# Patient Record
Sex: FEMALE | Race: WHITE | NOT HISPANIC OR LATINO | Employment: OTHER | URBAN - METROPOLITAN AREA
[De-identification: names, ages, dates, MRNs, and addresses within clinical notes are randomized per-mention and may not be internally consistent; named-entity substitution may affect disease eponyms.]

---

## 2019-03-26 ENCOUNTER — OFFICE VISIT (OUTPATIENT)
Dept: OBGYN CLINIC | Facility: OTHER | Age: 64
End: 2019-03-26
Payer: COMMERCIAL

## 2019-03-26 VITALS
BODY MASS INDEX: 27.93 KG/M2 | HEART RATE: 57 BPM | WEIGHT: 173.8 LBS | HEIGHT: 66 IN | DIASTOLIC BLOOD PRESSURE: 84 MMHG | SYSTOLIC BLOOD PRESSURE: 147 MMHG

## 2019-03-26 DIAGNOSIS — M46.1 SACROILIITIS (HCC): ICD-10-CM

## 2019-03-26 DIAGNOSIS — G89.29 CHRONIC RIGHT-SIDED LOW BACK PAIN, WITH SCIATICA PRESENCE UNSPECIFIED: Primary | ICD-10-CM

## 2019-03-26 DIAGNOSIS — S39.012A STRAIN OF LUMBAR REGION, INITIAL ENCOUNTER: ICD-10-CM

## 2019-03-26 DIAGNOSIS — M41.9 SCOLIOSIS OF THORACIC SPINE, UNSPECIFIED SCOLIOSIS TYPE: ICD-10-CM

## 2019-03-26 DIAGNOSIS — M54.5 CHRONIC RIGHT-SIDED LOW BACK PAIN, WITH SCIATICA PRESENCE UNSPECIFIED: Primary | ICD-10-CM

## 2019-03-26 PROCEDURE — 99203 OFFICE O/P NEW LOW 30 MIN: CPT | Performed by: ORTHOPAEDIC SURGERY

## 2019-03-26 RX ORDER — CALCIUM CARBONATE/VITAMIN D3 500-10/5ML
LIQUID (ML) ORAL
COMMUNITY
Start: 2013-07-29 | End: 2020-10-02 | Stop reason: SDUPTHER

## 2019-03-26 RX ORDER — TRAMADOL HYDROCHLORIDE 50 MG/1
50 TABLET ORAL EVERY 6 HOURS PRN
COMMUNITY
End: 2019-09-17 | Stop reason: SDUPTHER

## 2019-03-26 NOTE — PROGRESS NOTES
Assessment/Plan:    Diagnoses and all orders for this visit:    Chronic right-sided low back pain, with sciatica presence unspecified  -     Ambulatory referral to Physical Therapy; Future  -     Ambulatory referral to Pain Management; Future    Strain of lumbar region, initial encounter  -     Ambulatory referral to Physical Therapy; Future    Sacroiliitis ([facility] [address] )  -     Ambulatory referral to Physical Therapy; Future    Scoliosis of thoracic spine, unspecified scoliosis type    · Ibsi Mancia has chronic back pain and received therapeutic epidural injections by pain management in the past  · At this time, we recommended a formal course of physical therapy  · We also provided a referral to pain management for further consideration of epidural/sacroiliac injections  · We did request that You obtain medical records from prior pain management prior to following up with us  · Ibis Mancia can follow up with us on a PRN basis  · Ibis Mancia acknowledged understanding and agreement with the plan    Chief Complaint:  Back Pain    Subjective:     HPI    Ibis Mancia is a 58year old female that comes to the office for evaluation of back pain  She has a history of chronic back pain associated with lumbar radiculopathy and was following with pain management doctor more dissolve the for epidural lumbar injections in the right L2 and L3 transforaminal area, three total   Last injection was in August of 2018  Injections usually last 6 months  She also has a history of moderate scoliosis  She states that approximately 1 week ago she started to experience sudden onset of right-sided lumbar back pain after she was reaching for her dog on the ground  Giving her a sensation of "freezing up " Since then she has been experiencing sharp pain in the affected region, moderate to severe intensity, nonradiating  Pain is exacerbated with walking and sitting for long periods of time  Pain is a alleviated with lying down   She has a TENS unit and has been using it in the affected area  The pain has improved since original injury  Review of Systems   Constitutional: Negative for chills and fever  HENT: Negative for congestion, rhinorrhea and sore throat  Eyes: Negative for visual disturbance  Respiratory: Negative for shortness of breath  Cardiovascular: Negative for chest pain  Gastrointestinal: Negative for abdominal pain  Musculoskeletal: Positive for arthralgias (As per HPI)  Skin: Negative for rash  Objective:    Vitals:    03/26/19 0933   BP: 147/84   Pulse: 57       Physical Exam   Constitutional: She is oriented to person, place, and time  She appears well-developed and well-nourished  No distress  HENT:   Head: Normocephalic and atraumatic  Right Ear: External ear normal    Left Ear: External ear normal    Nose: Nose normal    Eyes: EOM are normal  No scleral icterus  Neck: Neck supple  Pulmonary/Chest: Effort normal  No respiratory distress  Abdominal: Soft  Neurological: She is alert and oriented to person, place, and time  Skin: Skin is warm  She is not diaphoretic  Psychiatric: She has a normal mood and affect  Back Exam     Tenderness   Back tenderness location: right lumbar region and SI joint  Range of Motion   Extension: normal   Flexion: normal   Lateral bend right: normal   Lateral bend left: normal   Rotation right: normal   Rotation left: normal     Muscle Strength   The patient has normal back strength  Tests   Straight leg raise right: negative  Straight leg raise left: negative    Reflexes   Patellar: normal  Achilles: normal    Other   Gait: normal     Comments:  Increased kyphosis and presence of scoliosis with left-sided thoracic curve  Positive ANNIKA on the right            I have personally reviewed pertinent films in PACS  Lumbar x-rays from 2015 reported mild retrolisthesis of L2 on L3, mild degenerative disc disease throughout lumbar spine and moderate scoliotic deformity

## 2019-03-27 ENCOUNTER — TELEPHONE (OUTPATIENT)
Dept: PAIN MEDICINE | Facility: CLINIC | Age: 64
End: 2019-03-27

## 2019-03-27 NOTE — TELEPHONE ENCOUNTER
Patient is trying to become a new patient with Dr Joy Cota, She was [facility] and they advised her unless the records request comes for the office it can take them a week to get he records sent  She is asking if the office can be contacted?  Phone #[phone]

## 2019-03-28 NOTE — TELEPHONE ENCOUNTER
It is the patient's responsibility to get their records to us unless she'd like to come to the office and fill out a Release of Medical Records form

## 2019-04-01 ENCOUNTER — EVALUATION (OUTPATIENT)
Dept: PHYSICAL THERAPY | Facility: REHABILITATION | Age: 64
End: 2019-04-01
Payer: COMMERCIAL

## 2019-04-01 DIAGNOSIS — G89.29 CHRONIC RIGHT-SIDED LOW BACK PAIN, WITH SCIATICA PRESENCE UNSPECIFIED: ICD-10-CM

## 2019-04-01 DIAGNOSIS — S39.012A STRAIN OF LUMBAR REGION, INITIAL ENCOUNTER: ICD-10-CM

## 2019-04-01 DIAGNOSIS — M46.1 SACROILIITIS (HCC): ICD-10-CM

## 2019-04-01 DIAGNOSIS — M54.5 CHRONIC RIGHT-SIDED LOW BACK PAIN, WITH SCIATICA PRESENCE UNSPECIFIED: ICD-10-CM

## 2019-04-01 PROCEDURE — 97161 PT EVAL LOW COMPLEX 20 MIN: CPT | Performed by: PHYSICAL THERAPIST

## 2019-04-04 ENCOUNTER — OFFICE VISIT (OUTPATIENT)
Dept: PHYSICAL THERAPY | Facility: REHABILITATION | Age: 64
End: 2019-04-04
Payer: COMMERCIAL

## 2019-04-04 DIAGNOSIS — S39.012A STRAIN OF LUMBAR REGION, INITIAL ENCOUNTER: ICD-10-CM

## 2019-04-04 DIAGNOSIS — M46.1 SACROILIITIS (HCC): ICD-10-CM

## 2019-04-04 DIAGNOSIS — G89.29 CHRONIC RIGHT-SIDED LOW BACK PAIN, WITH SCIATICA PRESENCE UNSPECIFIED: Primary | ICD-10-CM

## 2019-04-04 DIAGNOSIS — M54.5 CHRONIC RIGHT-SIDED LOW BACK PAIN, WITH SCIATICA PRESENCE UNSPECIFIED: Primary | ICD-10-CM

## 2019-04-04 PROCEDURE — 97140 MANUAL THERAPY 1/> REGIONS: CPT | Performed by: PHYSICAL THERAPIST

## 2019-04-04 PROCEDURE — 97112 NEUROMUSCULAR REEDUCATION: CPT | Performed by: PHYSICAL THERAPIST

## 2019-04-04 PROCEDURE — 97110 THERAPEUTIC EXERCISES: CPT | Performed by: PHYSICAL THERAPIST

## 2019-04-08 ENCOUNTER — TELEPHONE (OUTPATIENT)
Dept: PAIN MEDICINE | Facility: CLINIC | Age: 64
End: 2019-04-08

## 2019-04-09 ENCOUNTER — APPOINTMENT (OUTPATIENT)
Dept: PHYSICAL THERAPY | Facility: REHABILITATION | Age: 64
End: 2019-04-09
Payer: COMMERCIAL

## 2019-04-10 ENCOUNTER — APPOINTMENT (OUTPATIENT)
Dept: PHYSICAL THERAPY | Facility: REHABILITATION | Age: 64
End: 2019-04-10
Payer: COMMERCIAL

## 2019-04-23 ENCOUNTER — APPOINTMENT (OUTPATIENT)
Dept: PHYSICAL THERAPY | Facility: REHABILITATION | Age: 64
End: 2019-04-23
Payer: COMMERCIAL

## 2019-04-24 ENCOUNTER — CONSULT (OUTPATIENT)
Dept: PAIN MEDICINE | Facility: CLINIC | Age: 64
End: 2019-04-24
Payer: COMMERCIAL

## 2019-04-24 ENCOUNTER — HOSPITAL ENCOUNTER (OUTPATIENT)
Dept: RADIOLOGY | Facility: HOSPITAL | Age: 64
Discharge: HOME | End: 2019-04-24
Attending: ANESTHESIOLOGY
Payer: COMMERCIAL

## 2019-04-24 VITALS
HEART RATE: 64 BPM | SYSTOLIC BLOOD PRESSURE: 138 MMHG | TEMPERATURE: 99 F | BODY MASS INDEX: 27.92 KG/M2 | DIASTOLIC BLOOD PRESSURE: 72 MMHG | WEIGHT: 173 LBS

## 2019-04-24 DIAGNOSIS — M51.16 INTERVERTEBRAL DISC DISORDER WITH RADICULOPATHY OF LUMBAR REGION: Primary | ICD-10-CM

## 2019-04-24 DIAGNOSIS — M54.5 CHRONIC RIGHT-SIDED LOW BACK PAIN, WITH SCIATICA PRESENCE UNSPECIFIED: ICD-10-CM

## 2019-04-24 DIAGNOSIS — M25.551 RIGHT HIP PAIN: ICD-10-CM

## 2019-04-24 DIAGNOSIS — G89.29 CHRONIC RIGHT-SIDED LOW BACK PAIN, WITH SCIATICA PRESENCE UNSPECIFIED: ICD-10-CM

## 2019-04-24 PROCEDURE — 99244 OFF/OP CNSLTJ NEW/EST MOD 40: CPT | Performed by: ANESTHESIOLOGY

## 2019-04-24 PROCEDURE — 73502 X-RAY EXAM HIP UNI 2-3 VIEWS: CPT

## 2019-04-25 ENCOUNTER — APPOINTMENT (OUTPATIENT)
Dept: PHYSICAL THERAPY | Facility: REHABILITATION | Age: 64
End: 2019-04-25
Payer: COMMERCIAL

## 2019-04-29 ENCOUNTER — OFFICE VISIT (OUTPATIENT)
Dept: PHYSICAL THERAPY | Facility: REHABILITATION | Age: 64
End: 2019-04-29
Payer: COMMERCIAL

## 2019-04-29 ENCOUNTER — TELEPHONE (OUTPATIENT)
Dept: PAIN MEDICINE | Facility: CLINIC | Age: 64
End: 2019-04-29

## 2019-04-29 DIAGNOSIS — G89.29 CHRONIC RIGHT-SIDED LOW BACK PAIN, WITH SCIATICA PRESENCE UNSPECIFIED: Primary | ICD-10-CM

## 2019-04-29 DIAGNOSIS — M46.1 SACROILIITIS (HCC): ICD-10-CM

## 2019-04-29 DIAGNOSIS — S39.012A STRAIN OF LUMBAR REGION, INITIAL ENCOUNTER: ICD-10-CM

## 2019-04-29 DIAGNOSIS — M54.5 CHRONIC RIGHT-SIDED LOW BACK PAIN, WITH SCIATICA PRESENCE UNSPECIFIED: Primary | ICD-10-CM

## 2019-04-29 PROCEDURE — 97110 THERAPEUTIC EXERCISES: CPT | Performed by: PHYSICAL THERAPIST

## 2019-04-29 PROCEDURE — 97140 MANUAL THERAPY 1/> REGIONS: CPT | Performed by: PHYSICAL THERAPIST

## 2019-05-14 ENCOUNTER — HOSPITAL ENCOUNTER (OUTPATIENT)
Dept: RADIOLOGY | Facility: CLINIC | Age: 64
Discharge: HOME | End: 2019-05-14
Attending: ANESTHESIOLOGY
Payer: COMMERCIAL

## 2019-05-14 VITALS
SYSTOLIC BLOOD PRESSURE: 145 MMHG | TEMPERATURE: 98.8 F | HEART RATE: 83 BPM | OXYGEN SATURATION: 99 % | RESPIRATION RATE: 20 BRPM | DIASTOLIC BLOOD PRESSURE: 87 MMHG

## 2019-05-14 DIAGNOSIS — M51.16 INTERVERTEBRAL DISC DISORDER WITH RADICULOPATHY OF LUMBAR REGION: ICD-10-CM

## 2019-05-14 PROCEDURE — 64483 NJX AA&/STRD TFRM EPI L/S 1: CPT | Performed by: ANESTHESIOLOGY

## 2019-05-14 PROCEDURE — 64484 NJX AA&/STRD TFRM EPI L/S EA: CPT | Performed by: ANESTHESIOLOGY

## 2019-05-14 RX ORDER — METHYLPREDNISOLONE ACETATE 80 MG/ML
80 INJECTION, SUSPENSION INTRA-ARTICULAR; INTRALESIONAL; INTRAMUSCULAR; PARENTERAL; SOFT TISSUE ONCE
Status: COMPLETED | OUTPATIENT
Start: 2019-05-14 | End: 2019-05-14

## 2019-05-14 RX ORDER — BUPIVACAINE HCL/PF 2.5 MG/ML
10 VIAL (ML) INJECTION ONCE
Status: COMPLETED | OUTPATIENT
Start: 2019-05-14 | End: 2019-05-14

## 2019-05-14 RX ORDER — 0.9 % SODIUM CHLORIDE 0.9 %
10 VIAL (ML) INJECTION ONCE
Status: COMPLETED | OUTPATIENT
Start: 2019-05-14 | End: 2019-05-14

## 2019-05-14 RX ADMIN — BUPIVACAINE HYDROCHLORIDE 2 ML: 2.5 INJECTION, SOLUTION EPIDURAL; INFILTRATION; INTRACAUDAL at 10:10

## 2019-05-14 RX ADMIN — SODIUM CHLORIDE 4 ML: 9 INJECTION, SOLUTION INTRAMUSCULAR; INTRAVENOUS; SUBCUTANEOUS at 10:08

## 2019-05-14 RX ADMIN — Medication 4 ML: at 10:08

## 2019-05-14 RX ADMIN — IOHEXOL 1 ML: 300 INJECTION, SOLUTION INTRAVENOUS at 10:10

## 2019-05-14 RX ADMIN — METHYLPREDNISOLONE ACETATE 80 MG: 80 INJECTION, SUSPENSION INTRA-ARTICULAR; INTRALESIONAL; INTRAMUSCULAR; PARENTERAL; SOFT TISSUE at 10:10

## 2019-05-21 ENCOUNTER — TELEPHONE (OUTPATIENT)
Dept: PAIN MEDICINE | Facility: CLINIC | Age: 64
End: 2019-05-21

## 2019-07-16 ENCOUNTER — TELEPHONE (OUTPATIENT)
Dept: PAIN MEDICINE | Facility: CLINIC | Age: 64
End: 2019-07-16

## 2019-07-16 NOTE — TELEPHONE ENCOUNTER
Patient call requesting to speak to a nurse about when she is able to get her next injection & information about variations of injections   Please advisefred    Call back# [phone]

## 2019-07-16 NOTE — TELEPHONE ENCOUNTER
S/w pt, she said her R L2-3 TFESI on 5/14 did not work  Pt said she thinks it did not work because we did not use the same medications as Dr Colleen Kearns, he used 1 mL/40 mg Triamcinolone  Pt said when she got injections with him they lasted 6 months  Pt would like to discuss this in office and discuss us taking over Tramadol  Made pt an appt with HA on 7/24 to arrive at 8:45 am      **Dr Lee Grady, pt would like to know what you use that is similar to the Triamcinolone, if we do not use that med?

## 2019-07-16 NOTE — TELEPHONE ENCOUNTER
Pt contacted Call Center requested refill of their medication  Medication Name: Tramadol      Dosage of Med: 50 mg      Frequency of Med: Take 50 mg by mouth every 6 (six) hours as needed for moderate pain      Remaining Medication: 12 -15 tablets      Pharmacy and Location: [facility] on file        Pt  Preferred Callback Phone Number: [phone]       Thank you

## 2019-07-24 ENCOUNTER — OFFICE VISIT (OUTPATIENT)
Dept: PAIN MEDICINE | Facility: CLINIC | Age: 64
End: 2019-07-24
Payer: COMMERCIAL

## 2019-07-24 VITALS
RESPIRATION RATE: 18 BRPM | DIASTOLIC BLOOD PRESSURE: 86 MMHG | WEIGHT: 173 LBS | SYSTOLIC BLOOD PRESSURE: 124 MMHG | HEART RATE: 55 BPM | BODY MASS INDEX: 27.8 KG/M2 | HEIGHT: 66 IN

## 2019-07-24 DIAGNOSIS — Z79.891 LONG-TERM CURRENT USE OF OPIATE ANALGESIC: ICD-10-CM

## 2019-07-24 DIAGNOSIS — M51.16 INTERVERTEBRAL DISC DISORDER WITH RADICULOPATHY OF LUMBAR REGION: ICD-10-CM

## 2019-07-24 DIAGNOSIS — F11.20 UNCOMPLICATED OPIOID DEPENDENCE (HCC): ICD-10-CM

## 2019-07-24 DIAGNOSIS — M47.816 LUMBAR SPONDYLOSIS: ICD-10-CM

## 2019-07-24 DIAGNOSIS — G89.4 CHRONIC PAIN SYNDROME: Primary | ICD-10-CM

## 2019-07-24 PROCEDURE — 80305 DRUG TEST PRSMV DIR OPT OBS: CPT | Performed by: NURSE PRACTITIONER

## 2019-07-24 PROCEDURE — 99214 OFFICE O/P EST MOD 30 MIN: CPT | Performed by: NURSE PRACTITIONER

## 2019-07-24 NOTE — PROGRESS NOTES
Pt c/o lower back pain that radiates to the right hip and leg    Assessment:  1  Chronic pain syndrome    2  Intervertebral disc disorder with radiculopathy of lumbar region    3  Uncomplicated opioid dependence ([address] )    4  Long-term current use of opiate analgesic    5  Lumbar spondylosis        Plan:  Wilner Hannah is a 58 y o  female with a history of chronic right-sided low back pain with sciatica, lumbar intervertebral disc disorder radiculopathy, and right hip pain  The patient continues with ongoing low back pain, despite undergoing a  right L2-L3 transforaminal steroid injection  She reported that she had longer relief of symptoms after undergoing the same injection with another provider with the use of triamcinolone and would like to repeat the injection if this steroid was used  I discussed with the patient that she should have seen a relief in symptoms despite the steroid used for the injection  I discussed with her that she may have progression of disc protrusions or spondylosis within her lumbar spine that may be causing her not to have relief of symptoms after this injection  I recommended proceeding with an updated MRI of her lumbar spine for further evaluation  She was instructed that our office will call with results of this study once is completed  I did discuss with the patient that her symptoms may also be radiating from facet arthropathy noted in her lumbar spine  She was noted to have bilateral facet tenderness and bilateral facet loading on examination  I discussed with the patient about proceeding with bilateral L3-L5 medial branch blocks  However the patient reports that she would like to hold off on this procedure at this time  The patient was seeing Jessie Lara in the past and was prescribed tramadol 50 milligrams 3 times daily  She reports that she has greater than 20 tablets remaining from a prescription that was filled on 11/30/2018   She is requesting that our office take over prescribing this medication  I discussed with the patient that I will conduct a baseline urine drug screen this office visit  However, performing a baseline urine drug screen is not a guarantee that our office will take over prescribing her opioid medications  I discussed with her that our office would have to review the results of her urine drug screen and then would make a decision if she was a candidate for our office to take over prescribing her opioid medications  She verbalized understanding  I did discuss with the patient additional medications  However, the patient reports that she is not interested in trying any additional medications at this time, due to she tried multiple medications in the past and they were not helpful at managing her pain and symptoms  There are risks associated with opioid medications, including dependence, addiction and tolerance  The patient understands and agrees to use these medications only as prescribed  Potential side effects of the medications include, but are not limited to, constipation, drowsiness, addiction, impaired judgment and risk of fatal overdose if not taken as prescribed  The patient was warned against driving while taking sedation medications  Sharing medications is a felony  At this point in time, the patient is showing no signs of addiction, abuse, diversion or suicidal ideation  A urine drug screen was collected at today's office visit as part of our medication management protocol  The point of care testing results were appropriate for what was being prescribed  The specimen will be sent for confirmatory testing  The drug screen is medically necessary because the patient is either dependent on opioid medication or is being considered for opioid medication therapy and the results could impact ongoing or future treatment   The drug screen is to evaluate for the presences or absence of prescribed, non-prescribed, and/or illicit drugs/substances  [place] Prescription Drug Monitoring Program report was reviewed and was appropriate     The patient will follow up in 4 weeks or sooner with the worsening of symptoms  My impressions and treatment recommendations were discussed in detail with the patient who verbalized understanding and had no further questions  Discharge instructions were provided  I personally saw and examined the patient and I agree with the above discussed plan of care  Orders Placed This Encounter   Procedures    MRI lumbar spine wo contrast     Standing Status:   Future     Standing Expiration Date:   7/24/2023     Scheduling Instructions: There is no preparation for this test  Please leave your jewelry and valuables at home, wedding rings are the exception  Please bring your insurance cards, a form of photo ID and a list of your medications with you  Arrive 15 minutes prior to your appointment time in order to register  Please bring any prior CT or MRI studies of this area that were not performed at a [facility]  To schedule this appointment, please contact Central Scheduling at [phone]  Order Specific Question:   What is the patient's sedation requirement? Answer:   No Sedation     No orders of the defined types were placed in this encounter  History of Present Illness:  Dominic Mares is a 58 y o  female with a history of chronic right-sided low back pain with sciatica, lumbar intervertebral disc disorder radiculopathy, and right hip pain  The patient was last seen in the office on 5/14/2019 where she underwent a right L2-L3 transforaminal steroid injection  She reports that this injection helped for 20-30% for 1 week and then symptoms returned the following week  She presents for a follow up office visit in regards to Back Pain (radiates to the right hip and leg); Hip Pain; and Leg Pain     The patients current symptoms include right-sided low back pain that radiates down the anterior aspect of her right thigh to her knee  She does report the worsening of symptoms after gardening on 05/31/2019 where she bent over and felt a pop in her back that radiated across her back  She denies bilateral leg weakness or bowel or bladder issues  She describes her pain as burning, dull aching, sharp, shooting pain that is constant nature occurring throughout the day  The patient reports that her pain is symptoms are unchanged since last office visit  She currently rates her pain a 5 to 8/10 numeric pain scale  Current pain medications includes:  Tramadol 50 mg 1 tablet 3 times daily   The patient reports that this regimen is providing 30% pain relief  The patient is reporting no side effects from this pain medication regimen  I have personally reviewed and/or updated the patient's past medical history, past surgical history, family history, social history, current medications, allergies, and vital signs today  Review of Systems   Respiratory: Negative for shortness of breath  Cardiovascular: Negative for chest pain  Gastrointestinal: Negative for constipation, diarrhea, nausea and vomiting  Musculoskeletal: Positive for gait problem and joint swelling  Negative for arthralgias and myalgias  Decreased ROM  Joint stiffness   Skin: Negative for rash  Neurological: Negative for dizziness, seizures and weakness  All other systems reviewed and are negative        Patient Active Problem List   Diagnosis    Intervertebral disc disorder with radiculopathy of lumbar region       Past Medical History:   Diagnosis Date    Atypical ductal hyperplasia of right breast     Feb 2010 ([facility])     Decreased libido     Disorder of bone and cartilage     Resolved 5/29/2015     Dysplasia of cervix     s/p cone biopsy - 2002    Epilepsy [facility])     has not had any episodes in 20 years   [address] no specialists, no meds    Hypothyroidism, unspecified Resolved 5/29/2015     Menopause     Sept 2008     Osteoarthrosis of hand     Left    Osteoporosis     Resolved 5/29/2015     Seizure grand mal ([facility] [address] )     Ulcerative colitis ([facility] [address] )     last c-scope 8 yrs ago wnl, treated with suppositories in the past- not active x 8-10 yrs    Uterine fibroid     Vitamin D deficiency, unspecified     Resolved 5/29/2015        Past Surgical History:   Procedure Laterality Date    BREAST SURGERY Right 03/2010    [address] , left lumpectomies [facility] ~ [address]  2002       Family History   Problem Relation Age of Onset    Hypertension Father     Heart disease Father     Fibromyalgia Mother     Osteoporosis Mother        Social History     Occupational History    Not on file   Tobacco Use    Smoking status: Former Smoker    Smokeless tobacco: Never Used   Substance and Sexual Activity    Alcohol use: Yes     Comment: 1-2 glasses per night - As per Care Everywhere     Drug use: Not on file    Sexual activity: Not on file       Current Outpatient Medications on File Prior to Visit   Medication Sig    Butalbital-APAP-Caffeine (FIORICET PO) Take by mouth    Magnesium Oxide 400 MG CAPS Take one tablet by mouth daily    Multiple Vitamins-Minerals (MULTIVITAMIN ADULTS PO) Reported on 8/5/2017    traMADol (ULTRAM) 50 mg tablet Take 50 mg by mouth every 6 (six) hours as needed for moderate pain     No current facility-administered medications on file prior to visit  No Known Allergies    Physical Exam:    /86 (BP Location: Right arm, Patient Position: Sitting, Cuff Size: Standard)   Pulse 55   Resp 18   Ht 5' 6" (1 676 m)   Wt 78 5 kg (173 lb)   BMI 27 92 kg/m²     Constitutional:normal, well developed, well nourished, alert, in no distress and non-toxic and no overt pain behavior   and overweight  Eyes:anicteric  HEENT:grossly intact  Neck:supple, symmetric, trachea midline and no masses   Pulmonary:even and unlabored  Cardiovascular:No edema or pitting edema present  Skin:Normal without rashes or lesions and well hydrated  Psychiatric:Mood and affect appropriate  Neurologic:Cranial Nerves II-XII grossly intact  Musculoskeletal:antalgic     Lumbar Spine Exam    Appearance:  Normal lordosis  Palpation/Tenderness:  left lumbar paraspinal tenderness  right lumbar paraspinal tenderness  Sensory:  no sensory deficits noted  Range of Motion:  Flexion:  Minimally limited  with pain  Extension:  Minimally limited  with pain  Lateral Flexion - Left:  Minimally limited  with pain  Lateral Flexion - Right:  Minimally limited  with pain  Rotation - Left:  Minimally limited  with pain  Rotation - Right:  Minimally limited  with pain  Motor Strength:  Left hip flexion:  5/5  Right hip flexion:  5/5  Left knee extension:  5/5  Right knee extension:  5/5  Left foot dorsiflexion:  5/5  Left foot plantar flexion:  5/5  Right foot dorsiflexion:  5/5  Right foot plantar flexion:  5/5        Imaging  MRI of the lumbar spine (11/9/2016)    Technique: Sagittal T1 and STIR  Sagittal and axial T2  History: Right lumbar radiculopathy  There is mild retrolisthesis of L2 and L1  The alteration of the marrow signal intensity at the T12, L1, L2 and L3  endplates is secondary to degenerative change  There is no marrow replacing  process  There are benign hemangiomas in the T11 and T12 vertebral bodies  At T12-L1, the disc is bulging  There is also a small superimposed central disc  protrusion  Anterior marginal osteophytes are also noted at this level  The  central canal and the neural foramina are patent  At L1-2, there is a mild disc bulge associated with anterior marginal  osteophytes  At L2-3, the disc has lost height along the right lateral aspect where there  are marginal osteophytes  There are mild degenerative changes of the facet  joints  Right greater than left neural foraminal stenosis is noted at this  level      At L3-4, the disc is bulging  The facet joints are degenerated  The central  canal and the neural foramina are patent  At L4-5, there is a bulging disc associated with an anterior annular fissure  Minimal degenerative changes are noted in the facet joints  At L5-S1, there are minimal degenerative changes of the facet joints   Source  signal abnormalities are seen at the conus medullaris which is located at  T12-L1

## 2019-07-26 ENCOUNTER — TELEPHONE (OUTPATIENT)
Dept: PAIN MEDICINE | Facility: CLINIC | Age: 64
End: 2019-07-26

## 2019-07-26 NOTE — TELEPHONE ENCOUNTER
Patient is calling to let ARGUELLES know that she has her MRI scheduled at [facility] [place] on 8/12/19  Did ARGUELLES find out if she has to do PT first before the MRI?  Call back# [phone]

## 2019-07-26 NOTE — TELEPHONE ENCOUNTER
S/w pt  Advised that MRI was only scheduled today so auth team will contact insurance company  Advised too soon to tell if insurance will require PT first  Stanford Hernandez will reach out when there is an update  Pt verbalized understanding

## 2019-08-05 NOTE — TELEPHONE ENCOUNTER
Patient is requesting a call back  She would like to know if she should move forward with getting the MRI done  Please reach out to patient thank you

## 2019-08-05 NOTE — TELEPHONE ENCOUNTER
See message below, pt asking if her MRI was approved or will she have to do PT first? Pls advise so I can let pt know if she can proceed with her MRI

## 2019-08-08 NOTE — TELEPHONE ENCOUNTER
Pt calling to see is it is possible to use the same depomedrol as previously use on her         Pt can be reached at [phone]

## 2019-08-08 NOTE — TELEPHONE ENCOUNTER
Pt informed that per Susanna Jarvis in the MRI auth team that no Melissa Kelley is required for her MRI, she can call and schedule her MRI

## 2019-08-08 NOTE — TELEPHONE ENCOUNTER
S/w pt, she is asking if Dr Jose Desouza can use Kenalog instead of Depomedrol, explained to pt that they are the same class of medication  Pt said she only wants to proceed with an injection if she can get Kenalog  Pt said she will have to go elsewhere if not  Pt said the injection also cost her $8000 last time so she wants something that will work  Pt said she is still going to get an MRI though

## 2019-08-12 ENCOUNTER — HOSPITAL ENCOUNTER (OUTPATIENT)
Dept: MRI IMAGING | Facility: HOSPITAL | Age: 64
Discharge: HOME | End: 2019-08-12
Payer: COMMERCIAL

## 2019-08-12 DIAGNOSIS — M51.16 INTERVERTEBRAL DISC DISORDER WITH RADICULOPATHY OF LUMBAR REGION: ICD-10-CM

## 2019-08-12 PROCEDURE — 72148 MRI LUMBAR SPINE W/O DYE: CPT

## 2019-08-16 ENCOUNTER — TELEPHONE (OUTPATIENT)
Dept: PAIN MEDICINE | Facility: CLINIC | Age: 64
End: 2019-08-16

## 2019-08-16 NOTE — TELEPHONE ENCOUNTER
I called the patient and left a message on her voice mail to call the office back for the results of her lumbar MRI  I also tried her home number which was busy  When she calls back can you have either Dr MCRAE or Elma Hackett go over her results with her

## 2019-08-23 NOTE — TELEPHONE ENCOUNTER
Please address when you return  She will ask about kenalog again - no  We do not use kenalog for our injections  We use depomedrol which is in the same class of steroids

## 2019-08-27 ENCOUNTER — OFFICE VISIT (OUTPATIENT)
Dept: PAIN MEDICINE | Facility: CLINIC | Age: 64
End: 2019-08-27
Payer: COMMERCIAL

## 2019-08-27 VITALS
DIASTOLIC BLOOD PRESSURE: 84 MMHG | BODY MASS INDEX: 27.8 KG/M2 | SYSTOLIC BLOOD PRESSURE: 126 MMHG | WEIGHT: 173 LBS | HEIGHT: 66 IN | RESPIRATION RATE: 18 BRPM

## 2019-08-27 DIAGNOSIS — G89.4 CHRONIC PAIN SYNDROME: Primary | ICD-10-CM

## 2019-08-27 DIAGNOSIS — M51.16 INTERVERTEBRAL DISC DISORDER WITH RADICULOPATHY OF LUMBAR REGION: ICD-10-CM

## 2019-08-27 DIAGNOSIS — M48.061 SPINAL STENOSIS OF LUMBAR REGION, UNSPECIFIED WHETHER NEUROGENIC CLAUDICATION PRESENT: ICD-10-CM

## 2019-08-27 DIAGNOSIS — O34.10 UTERINE FIBROIDS IN PREGNANCY, POSTPARTUM CONDITION: ICD-10-CM

## 2019-08-27 DIAGNOSIS — M47.816 LUMBAR SPONDYLOSIS: ICD-10-CM

## 2019-08-27 DIAGNOSIS — D25.9 UTERINE FIBROIDS IN PREGNANCY, POSTPARTUM CONDITION: ICD-10-CM

## 2019-08-27 PROCEDURE — 99213 OFFICE O/P EST LOW 20 MIN: CPT | Performed by: NURSE PRACTITIONER

## 2019-08-27 NOTE — PROGRESS NOTES
Pt c/o lower back pain that radiates to the right hip and leg    Assessment:  1  Chronic pain syndrome    2  Uterine fibroids in pregnancy, postpartum condition    3  Intervertebral disc disorder with radiculopathy of lumbar region    4  Lumbar spondylosis    5  Spinal stenosis of lumbar region, unspecified whether neurogenic claudication present        Plan:  Jahaira Stark is a 58 y o  female with a history of chronic pain syndrome secondary to lumbar intervertebral disc disorder radiculopathy, lumbar spondylosis  The patient continues with ongoing low back pain, which has worsened since last office visit  The patient did have an updated MRI of her lumbar spine since last office visit  , which was reviewed with her this office visit  Her MRI showed scoliosis and a mild spondylolisthesis at L1-L2 as well as a lateral subluxation at L1-L2 and L2-L3 resulting in mild spinal stenosis  She was also noted to have a bulge L3-4  I discussed with the patient about proceeding with a lumbar epidural steroid injection  However the patient reports that she would not like to proceed with any epidural steroid injections with our office due to we do not use kenalog for the injections  She reports that she will seek our another pain management doctor or return to [facility] for injections  She did want our office to take over her pain medications  However, since she will be transferring to another pain management office she will ask her family doctor for a small prescription prior to signing a new opioid contract with a new pain management specialist     The patient was also noted to have multiple uterine fibroids that were incidentally noted on her lumbar MRI with the largest fibroid measuring 4 6 cm  Radiology recommended that she follow up with an ultrasound for further evaluation  Therefore at this time I have ordered the patient an transvaginal ultrasound of her pelvis for further evaluation    She was also instructed to follow-up with her OBGYN doctor as well  The patient will follow up with our office on a as needed basis or sooner with the worsening of symptoms  My impressions and treatment recommendations were discussed in detail with the patient who verbalized understanding and had no further questions  Discharge instructions were provided  I personally saw and examined the patient and I agree with the above discussed plan of care  Orders Placed This Encounter   Procedures    US abdomen and pelvis with transvaginal     Standing Status:   Future     Standing Expiration Date:   8/27/2023     Scheduling Instructions:      No Food, coffee, carbonated beverages or dairy products after midnight for AM appointments  Water (unlimited amounts) and medications are allowed  PM patients must not have any food coffee, carbonated beverages or dairy products for a period of 4-8 hours prior to their appointment  Diabetics who need PM appointments may have a light breakfast but cannot have any additional food, carbonated beverages or dairy products after breakfast  Water (unlimited amounts) and medications are okay to have  Schedule the PM appointments for 4-8 hours after that morning meal             IF the patient is having a Pelvic Ultrasound or Renal Ultrasound the bladder filling prep must be given to the patient  These studies require drinking 24 ounces of water one hour prior to the appointment and they are NOT to empty their bladder before having their ultrasound examination  Please bring your insurance cards, a form of photo ID and a list of your medications with you  Arrive 15 minutes prior to your appointment time in order to register  To schedule this appointment, please contact Central Scheduling at [phone]       Order Specific Question:   Is this study for the evaluation of upper abdomen and pelvis with transvaginal?     Answer:   Yes     No orders of the defined types were placed in this encounter  History of Present Illness:  Víctor Alston is a 58 y o  female with a history of chronic pain syndrome secondary to lumbar intervertebral disc disorder radiculopathy, lumbar spondylosis  The patient was last seen in the office on 7/24/2019, where she was ordered an updated MRI of her lumbar spine for further evaluation  She presents for a follow up office visit in regards to Back Pain (radiates to the right hip and leg); Hip Pain; and Leg Pain  The patients current symptoms include right-sided low back pain that radiates down the lateral aspect of her right thigh stopping mid thigh  She denies bilateral leg weakness, or bowel or bladder issues  She describes her pain as dull aching, sharp, throbbing, shooting pain that is constant nature occurring throughout the day  The patient reports that her pain and symptoms have worsened since last office visit  She currently rates her pain as 7 out 10 numeric pain scale  I have personally reviewed and/or updated the patient's past medical history, past surgical history, family history, social history, current medications, allergies, and vital signs today       Review of Systems    Patient Active Problem List   Diagnosis    Intervertebral disc disorder with radiculopathy of lumbar region       Past Medical History:   Diagnosis Date    Atypical ductal hyperplasia of right breast     Feb 2010 ([facility]     Decreased libido     Disorder of bone and cartilage     Resolved 5/29/2015     Dysplasia of cervix     s/p cone biopsy - 2002    Epilepsy ([facility] [address] )     has not had any episodes in 20 years    Fibromyalgia     1988 no specialists, no meds    Hypothyroidism, unspecified     Resolved 5/29/2015     Menopause     Sept 2008     Osteoarthrosis of hand     Left    Osteoporosis     Resolved 5/29/2015     Seizure grand mal ([facility] [address] )     Ulcerative colitis ([facility] [address] )     last c-scope 8 yrs ago wnl, treated with suppositories in the past- not active x 8-10 yrs    Uterine fibroid     Vitamin D deficiency, unspecified     Resolved 5/29/2015        Past Surgical History:   Procedure Laterality Date    BREAST SURGERY Right 03/2010    [address] , left lumpectomies [facility] ~ [facility] BIOPSY  2002       Family History   Problem Relation Age of Onset    Hypertension Father     Heart disease Father     Fibromyalgia Mother     Osteoporosis Mother        Social History     Occupational History    Not on file   Tobacco Use    Smoking status: Former Smoker    Smokeless tobacco: Never Used   Substance and Sexual Activity    Alcohol use: Yes     Comment: 1-2 glasses per night - As per Care Everywhere     Drug use: Not on file    Sexual activity: Not on file       Current Outpatient Medications on File Prior to Visit   Medication Sig    Butalbital-APAP-Caffeine (FIORICET PO) Take by mouth    Magnesium Oxide 400 MG CAPS Take one tablet by mouth daily    Multiple Vitamins-Minerals (MULTIVITAMIN ADULTS PO) Reported on 8/5/2017    traMADol (ULTRAM) 50 mg tablet Take 50 mg by mouth every 6 (six) hours as needed for moderate pain     No current facility-administered medications on file prior to visit  No Known Allergies    Physical Exam:    /84 (BP Location: Right arm, Patient Position: Sitting, Cuff Size: Standard)   Resp 18   Ht 5' 6" (1 676 m)   Wt 78 5 kg (173 lb)   BMI 27 92 kg/m²     Constitutional:normal, well developed, well nourished, alert, in no distress and non-toxic and no overt pain behavior   and overweight  Eyes:anicteric  HEENT:grossly intact  Neck:supple, symmetric, trachea midline and no masses   Pulmonary:even and unlabored  Cardiovascular:No edema or pitting edema present  Skin:Normal without rashes or lesions and well hydrated  Psychiatric:Mood and affect appropriate  Neurologic:Cranial Nerves II-XII grossly intact  Musculoskeletal:antalgic     Lumbar Spine Exam    Appearance:  Normal lordosis  Palpation/Tenderness:  left lumbar paraspinal tenderness  right lumbar paraspinal tenderness  Sensory:  no sensory deficits noted  Range of Motion:  Flexion:  Minimally limited  with pain  Extension:  Minimally limited  with pain  Lateral Flexion - Left:  Minimally limited  with pain  Lateral Flexion - Right:  Minimally limited  with pain  Rotation - Left:  Minimally limited  with pain  Rotation - Right:  Minimally limited  with pain  Motor Strength:  Left hip flexion:  5/5  Right hip flexion:  5/5  Left knee extension:  5/5  Right knee extension:  5/5  Left foot dorsiflexion:  5/5  Left foot plantar flexion:  5/5  Right foot dorsiflexion:  5/5  Right foot plantar flexion:  5/5    Imaging    MRI LUMBAR SPINE WITHOUT CONTRAST     INDICATION: Intermittent low back pain radiating to the right knee      COMPARISON:  Radiographs of lumbar spine from July 21, 2015      TECHNIQUE:  Sagittal T1, sagittal T2, sagittal inversion recovery, axial T1 and axial T2, coronal T2     IMAGE QUALITY:  Diagnostic     FINDINGS:     VERTEBRAL BODIES:  Levoscoliotic curvature to the lumbar spine is noted  The apex at the L2-L3 level for there is mild leftward lateral subluxation  There is also mild rightward lateral subluxation of L1 on L2  The vertebral bodies are maintained in   stature  There is a preserved lordotic curvature to the lumbar spine  Grade 1 retrolisthesis of L2 on L3 is noted  There is no evidence of spondylolysis  Heterogeneous marrow signal is noted within the L2 vertebral body with suggestion of multiple   vertebral body hemangiomas  There is also a hemangioma within the L3 vertebral body      SACRUM:  Normal signal within the sacrum  No evidence of insufficiency or stress fracture      DISTAL CORD AND CONUS:  Normal size and signal within the distal cord and conus        PARASPINAL SOFT TISSUES:  No prevertebral or paravertebral soft tissue edema    Suggestion of dorsal paraspinal muscular atrophy in the lower lumbar and upper sacral spine, slightly more pronounced on the left than the right  Leiomyomatous uterus with   multiple partially visualized uterine fibroids  The largest measures approximately 4 6 cm within the uterine fundus      LOWER THORACIC DISC SPACES:  Mild degenerative discogenic disease with ventral indentation on the thecal sac with no greater than mild spinal stenosis at T11-T12 and T12-L1  The neural foramina remain patent      LUMBAR DISC SPACES:  Multilevel disc desiccation with disc height loss most notable at L1-L2 and L2-L3      L1-L2:  Mild rightward lateral subluxation and uncovering of the left lateral margin of the disc annulus  Mild circumferential disc bulge and a tiny central protrusion  There is mild ventral indentation on the thecal sac  The spinal canal and neural   foramina remain patent      L2-L3:  Mild retrolisthesis and endplate osteophytic ridging with a circumferential disc bulge and mild facet arthropathy  There is mild ventral indentation on the thecal sac and mild spinal stenosis  The neural foramina remain patent      L3-L4:  Mild circumferential disc bulge with mild spinal stenosis  Patent neural foramina      L4-L5:  Normal      L5-S1:  Minimal facet arthropathy      IMPRESSION:     Scoliosis and mild spondylolisthesis as well as lateral subluxation with degenerative discogenic disease at L1-L2 and L2-L3 resulting in no greater than mild spinal stenosis      Disc bulge at L3-L4 results in mild spinal stenosis      No significant foraminal stenosis     Unexpected incidental finding of multiple uterine fibroids, the largest is identified within the fundus and noted to measure 4 6 cm    Consider follow-up sonography for additional characterization

## 2019-09-14 PROBLEM — M81.0 OSTEOPOROSIS OF LUMBAR SPINE: Status: ACTIVE | Noted: 2019-09-14

## 2019-09-14 PROBLEM — M51.369 DDD (DEGENERATIVE DISC DISEASE), LUMBAR: Status: ACTIVE | Noted: 2019-09-14

## 2019-09-14 PROBLEM — M51.36 DDD (DEGENERATIVE DISC DISEASE), LUMBAR: Status: ACTIVE | Noted: 2019-09-14

## 2019-09-17 ENCOUNTER — OFFICE VISIT (OUTPATIENT)
Dept: FAMILY MEDICINE CLINIC | Facility: CLINIC | Age: 64
End: 2019-09-17
Payer: MEDICARE

## 2019-09-17 VITALS
TEMPERATURE: 98.9 F | BODY MASS INDEX: 28.29 KG/M2 | RESPIRATION RATE: 16 BRPM | DIASTOLIC BLOOD PRESSURE: 78 MMHG | SYSTOLIC BLOOD PRESSURE: 124 MMHG | WEIGHT: 169.8 LBS | HEIGHT: 65 IN | OXYGEN SATURATION: 98 % | HEART RATE: 62 BPM

## 2019-09-17 DIAGNOSIS — G40.309 GENERALIZED CONVULSIVE EPILEPSY (HCC): ICD-10-CM

## 2019-09-17 DIAGNOSIS — Z13.220 LIPID SCREENING: ICD-10-CM

## 2019-09-17 DIAGNOSIS — Z00.00 WELL ADULT EXAM: Primary | ICD-10-CM

## 2019-09-17 DIAGNOSIS — M41.9 KYPHOSCOLIOSIS: ICD-10-CM

## 2019-09-17 DIAGNOSIS — M81.0 OSTEOPOROSIS OF LUMBAR SPINE: ICD-10-CM

## 2019-09-17 DIAGNOSIS — M51.16 INTERVERTEBRAL DISC DISORDER WITH RADICULOPATHY OF LUMBAR REGION: ICD-10-CM

## 2019-09-17 DIAGNOSIS — M79.7 FIBROMYALGIA: ICD-10-CM

## 2019-09-17 DIAGNOSIS — R73.01 IFG (IMPAIRED FASTING GLUCOSE): ICD-10-CM

## 2019-09-17 DIAGNOSIS — E55.9 VITAMIN D DEFICIENCY: ICD-10-CM

## 2019-09-17 DIAGNOSIS — G43.009 MIGRAINE WITHOUT AURA AND WITHOUT STATUS MIGRAINOSUS, NOT INTRACTABLE: ICD-10-CM

## 2019-09-17 DIAGNOSIS — Z11.59 NEED FOR HEPATITIS C SCREENING TEST: ICD-10-CM

## 2019-09-17 DIAGNOSIS — K51.819 OTHER ULCERATIVE COLITIS WITH COMPLICATION (HCC): ICD-10-CM

## 2019-09-17 DIAGNOSIS — M51.36 DDD (DEGENERATIVE DISC DISEASE), LUMBAR: ICD-10-CM

## 2019-09-17 PROBLEM — K51.90 ULCERATIVE COLITIS (HCC): Status: ACTIVE | Noted: 2019-09-17

## 2019-09-17 PROCEDURE — G0402 INITIAL PREVENTIVE EXAM: HCPCS | Performed by: FAMILY MEDICINE

## 2019-09-17 PROCEDURE — 99203 OFFICE O/P NEW LOW 30 MIN: CPT | Performed by: FAMILY MEDICINE

## 2019-09-17 RX ORDER — TRAMADOL HYDROCHLORIDE 50 MG/1
50 TABLET ORAL EVERY 6 HOURS PRN
Qty: 30 TABLET | Status: CANCELLED | OUTPATIENT
Start: 2019-09-17

## 2019-09-17 RX ORDER — BUTALBITAL, ACETAMINOPHEN AND CAFFEINE 300; 40; 50 MG/1; MG/1; MG/1
1 CAPSULE ORAL AS NEEDED
Qty: 30 CAPSULE | Refills: 1 | Status: SHIPPED | OUTPATIENT
Start: 2019-09-17 | End: 2020-03-25 | Stop reason: SDUPTHER

## 2019-09-17 RX ORDER — TRAMADOL HYDROCHLORIDE 50 MG/1
50 TABLET ORAL 2 TIMES DAILY PRN
Qty: 90 TABLET | Refills: 0 | Status: SHIPPED | OUTPATIENT
Start: 2019-09-17 | End: 2021-01-13 | Stop reason: SDUPTHER

## 2019-09-17 NOTE — PROGRESS NOTES
Assessment and Plan:     Problem List Items Addressed This Visit        Active Problems    Intervertebral disc disorder with radiculopathy of lumbar region    Osteoporosis of lumbar spine    DDD (degenerative disc disease), lumbar    Relevant Medications    traMADol (ULTRAM) 50 mg tablet    Fibromyalgia    Generalized convulsive epilepsy ([facility] [address] )    Kyphoscoliosis    Migraine without aura    Relevant Medications    traMADol (ULTRAM) 50 mg tablet    Butalbital-APAP-Caffeine (FIORICET) -40 MG CAPS    Vitamin D deficiency    Relevant Orders    Vitamin D 25 hydroxy    Ulcerative colitis ([facility] [address] )    Relevant Orders    Ambulatory referral to Colorectal Surgery      Other Visit Diagnoses     Well adult exam    -  Primary    IFG (impaired fasting glucose)        Relevant Orders    Comprehensive metabolic panel    Hemoglobin A1C    Lipid screening        Relevant Orders    Lipid panel    Need for hepatitis C screening test        Relevant Orders    Hepatitis C antibody           Reviewed appropriate diabetic & cardiovascular screening and prevention  Reviewed age appropriate cancer screenings and pros and cons  - she agrees to colonoscopy but declines mammo  She agrees to update pap  Last was normal    Reviewed bloodwork & immunizations that are appropriate for this patient  - she declines immunizations but agrees to blood work  Advanced care planning was reviewed  She has an advanced directive and will bring in paperwork for us  After reviews of risks, benefits of above ordered appropriate tests that pt agrees to  History of Present Illness:     Patient presents for Welcome to Medicare visit  Patient Care Team:  Rito Hill DO as PCP - General (Family Medicine)     Review of Systems:     Review of Systems   Constitutional: Negative for activity change, appetite change, chills, fever and unexpected weight change  HENT: Negative for congestion, ear pain and sore throat  Eyes: Negative for pain  Respiratory: Negative for shortness of breath  Cardiovascular: Negative for chest pain and leg swelling  Gastrointestinal: Negative for abdominal pain, constipation, diarrhea, nausea and vomiting  Genitourinary: Negative for dysuria  Musculoskeletal: Negative for joint swelling and myalgias  Skin: Negative for rash  Neurological: Negative for dizziness  Hematological: Negative for adenopathy  Psychiatric/Behavioral: Negative for dysphoric mood and sleep disturbance        Problem List:     Patient Active Problem List   Diagnosis    Intervertebral disc disorder with radiculopathy of lumbar region    Osteoporosis of lumbar spine    DDD (degenerative disc disease), lumbar    Fibromyalgia    Generalized convulsive epilepsy ([facility] [address] [facility]    Kyphoscoliosis    Migraine without aura    Varicose veins    Vitamin D deficiency    Ulcerative colitis ([facility] [address] )      Past Medical and Surgical History:     Past Medical History:   Diagnosis Date    Atypical ductal hyperplasia of right breast     Feb 2010 ([facility])     Decreased libido     Disorder of bone and cartilage     Resolved 5/29/2015     Dysplasia of cervix     s/p cone biopsy - 2002    Epilepsy ([facility] [address] )     has not had any episodes in 20 years   [address] no specialists, no meds    Hypothyroidism, unspecified     Resolved 5/29/2015     Menopause     Sept 2008     Osteoarthrosis of hand     Left    Osteoporosis     Resolved 5/29/2015     Seizure grand mal ([facility] [address] )     Ulcerative colitis ([facility] [address] )     last c-scope 8 yrs ago wnl, treated with suppositories in the past- not active x 8-10 yrs    Uterine fibroid     Vitamin D deficiency, unspecified     Resolved 5/29/2015      Past Surgical History:   Procedure Laterality Date    BREAST SURGERY Right 03/2010    JANKI LAND  [facility] , left lum[facility]omies [facility] ~ [address]  2002      Family History:     Family History   Problem Relation Age of Onset    Hypertension Father    Kelly Solis Heart disease Father     Heart attack Father     Fibromyalgia Mother     Osteoporosis Mother       Social History:     Social History     Socioeconomic History    Marital status: /Civil Union     Spouse name: None    Number of children: None    Years of education: None    Highest education level: None   Occupational History    None   Social Needs    Financial resource strain: None    Food insecurity:     Worry: None     Inability: None    Transportation needs:     Medical: None     Non-medical: None   Tobacco Use    Smoking status: Former Smoker     Packs/day: 0 25     Years: 4 00     Pack years: 1 00     Last attempt to quit: 1970     Years since quittin 0    Smokeless tobacco: Never Used   Substance and Sexual Activity    Alcohol use: Yes     Comment: 1-2 glasses per night - As per Care Everywhere     Drug use: None    Sexual activity: None   Lifestyle    Physical activity:     Days per week: None     Minutes per session: None    Stress: None   Relationships    Social connections:     Talks on phone: None     Gets together: None     Attends L[employer] day service: None     Active member of club or organization: None     Attends meetings of clubs or organizations: None     Relationship status: None    Intimate partner violence:     Fear of current or ex partner: None     Emotionally abused: None     Physically abused: None     Forced sexual activity: None   Other Topics Concern    None   Social History Narrative    None      Medications and Allergies:     Current Outpatient Medications   Medication Sig Dispense Refill    Magnesium Oxide 400 MG CAPS Take one tablet by mouth daily      Multiple Vitamins-Minerals (MULTIVITAMIN ADULTS PO) Reported on 2017      traMADol (ULTRAM) 50 mg tablet Take 1 tablet (50 mg total) by mouth 2 (two) times a day as needed for moderate pain 90 tablet 0    Butalbital-APAP-Caffeine (FIORICET) -40 MG CAPS Take 1 tablet by mouth as needed (migraine) 30 capsule 1     No current facility-administered medications for this visit  No Known Allergies   Immunizations: There is no immunization history on file for this patient  Health Maintenance:         Topic Date Due    Hepatitis C Screening  1955    MAMMOGRAM  1955    CRC Screening: Colonoscopy  1955         Topic Date Due    DTaP,Tdap,and Td Vaccines (1 - Tdap) 09/20/1975    INFLUENZA VACCINE  07/01/2019      Medicare Screening Tests and Risk Assessments:     Esvinadwoa Quinn is here for her Welcome to Medicare visit  Health Risk Assessment:   Patient rates overall health as very good  Patient feels that their physical health rating is slightly worse  Eyesight was rated as slightly worse  Hearing was rated as same  Patient feels that their emotional and mental health rating is same  Pain experienced in the last 7 days has been a lot  Patient's pain rating has been 9/10  Patient states that she has experienced no weight loss or gain in last 6 months  Fall Risk Screening: In the past year, patient has experienced: no history of falling in past year      Urinary Incontinence Screening:   Patient has not leaked urine accidently in the last six months  Home Safety:  Patient does not have trouble with stairs inside or outside of their home  Patient has working smoke alarms and has no working carbon monoxide detector  Home safety hazards include: none  Nutrition:   Current diet is Regular  Medications:   Patient is currently taking over-the-counter supplements  OTC medications include: see medication list  Patient is able to manage medications  Activities of Daily Living (ADLs)/Instrumental Activities of Daily Living (IADLs):   Walk and transfer into and out of bed and chair?: Yes  Dress and groom yourself?: Yes    Bathe or shower yourself?: Yes    Feed yourself?  Yes  Do your laundry/housekeeping?: Yes  Manage your money, pay your bills and track your expenses?: Yes  Make your own meals?: Yes    Do your own shopping?: Yes    Previous Hospitalizations:   Any hospitalizations or ED visits within the last 12 months?: No      Advance Care Planning:   Living will: No    Advanced directive: Yes    Advanced directive counseling given: Yes    End of Life Decisions reviewed with patient: Yes      Comments: Daughter is medical POA if needed - [place]  Would not want to remain on machines indefinitely  If there is hope, try reasonable options  Cognitive Screening:   Provider or family/friend/caregiver concerned regarding cognition?: No    PREVENTIVE SCREENINGS      Cardiovascular Screening:      Due for: Lipid Panel      Diabetes Screening:       Due for: Blood Glucose      Colorectal Cancer Screening:       Due for: Colonoscopy - High Risk      Breast Cancer Screening:     General: Patient Declines      Cervical Cancer Screening:      Due for: Cervical Pap Smear      Osteoporosis Screening:    General: Patient Declines and History Osteoporosis      Abdominal Aortic Aneurysm (AAA) Screening:        General: Screening Not Indicated      Lung Cancer Screening:     General: Screening Not Indicated      Hepatitis C Screening:      Hep C Screening Accepted: Yes       Visual Acuity Screening    Right eye Left eye Both eyes   Without correction:   20/30   With correction:           Physical Exam:     /78   Pulse 62   Temp 98 9 °F (37 2 °C) (Tympanic)   Resp 16   Ht 5' 5 35" (1 66 m)   Wt 77 kg (169 lb 12 8 oz)   SpO2 98%   BMI 27 95 kg/m²     Physical Exam  Constitutional: Appears well-developed and well-nourished  HENT: Head: Normocephalic  Sclera clear  Right Ear: External ear normal  Tympanic membrane normal    Left Ear: External ear normal  Tympanic membrane normal    Nose: Nose normal  No mucosal edema, No rhinorrhea  Mouth/Throat: Oropharynx is clear and moist   Eyes: Normal conjunctiva  No erythema  No discharge  Neck: Neck supple   No thyromegaly  Cardiovascular: Normal rate, regular rhythm and normal heart sounds  No murmur  Pulmonary/Chest: Effort normal and breath sounds normal  No wheezes, rales, or rhonchi  Abdominal: Soft  Bowel sounds are normal  There is no tenderness  No hepatosplenomegaly  Musculoskeletal: No lower extremity edema  + large kyphosclosis  Lymphadenopathy: No cervical adenopathy  Neurological: Alert and oriented to person, place, and time  Skin: Skin is warm and dry  No rash or lesions on scalp  Psychiatric: Normal mood and affect   Behavior is normal  Thought content normal      Ai Lavender, DO

## 2019-09-17 NOTE — PATIENT INSTRUCTIONS
Chair yoga   Check out Silver sneakers     If you want to go to PT call and ask for a referral:   Claire Hines [id number] location [address] [facility], [address] [facility] [address], [facility], [address]    [place] [address]  [address] [facility], [address] [place] [address], [address]             [employer] Preventive Visit Patient Instructions  Thank you for completing your Welcome to Medicare Visit or Medicare Annual Wellness Visit today  Your next wellness visit will be due in one year (9/17/2020)  The screening/preventive services that you may require over the next 5-10 years are detailed below  Some tests may not apply to you based off risk factors and/or age  Screening tests ordered at today's visit but not completed yet may show as past due  Also, please note that scanned in results may not display below  Preventive Screenings:  Service Recommendations Previous Testing/Comments   Colorectal Cancer Screening  * Colonoscopy    * Fecal Occult Blood Test (FOBT)/Fecal Immunochemical Test (FIT)  * Fecal DNA/Cologuard Test  * Flexible Sigmoidoscopy Age: 54-65 years old   Colonoscopy: every 10 years (may be performed more frequently if at higher risk)  OR  FOBT/FIT: every 1 year  OR  Cologuard: every 3 years  OR  Sigmoidoscopy: every 5 years  Screening may be recommended earlier than age 48 if at higher risk for colorectal cancer  Also, an individualized decision between you and your healthcare provider will decide whether screening between the ages of 74-80 would be appropriate   Colonoscopy: Not on file  FOBT/FIT: Not on file  Cologuard: Not on file  Sigmoidoscopy: Not on file         Breast Cancer Screening Age: 36 years old  Frequency: every 1-2 years  Not required if history of left and right mastectomy Mammogram: Not on file       Cervical Cancer Screening Between the ages of 21-29, pap smear recommended once every 3 years  Between the ages of 33-67, can perform pap smear with HPV co-testing every 5 years  Recommendations may differ for women with a history of total hysterectomy, cervical cancer, or abnormal pap smears in past  Pap Smear: Not on file       Hepatitis C Screening Once for adults born between 1945 and 1965  More frequently in patients at high risk for Hepatitis C Hep C Antibody: Not on file       Diabetes Screening 1-2 times per year if you're at risk for diabetes or have pre-diabetes Fasting glucose: No results in last 5 years   A1C: No results in last 5 years       Cholesterol Screening Once every 5 years if you don't have a lipid disorder  May order more often based on risk factors  Lipid panel: Not on file         Other Preventive Screenings Covered by Medicare:  1  Abdominal Aortic Aneurysm (AAA) Screening: covered once if your at risk  You're considered to be at risk if you have a family history of AAA  2  Lung Cancer Screening: covers low dose CT scan once per year if you meet all of the following conditions: (1) Age 50-69; (2) No signs or symptoms of lung cancer; (3) Current smoker or have quit smoking within the last 15 years; (4) You have a tobacco smoking history of at least 30 pack years (packs per day multiplied by number of years you smoked); (5) You get a written order from a healthcare provider  3  Glaucoma Screening: covered annually if you're considered high risk: (1) You have diabetes OR (2) Family history of glaucoma OR (3)  aged 48 and older OR (3)  American aged 72 and older  3   Osteoporosis Screening: covered every 2 years if you meet one of the following conditions: (1) You're estrogen deficient and at risk for osteoporosis based off medical history and other findings; (2) Have a vertebral abnormality; (3) On glucocorticoid therapy for more than 3 months; (4) Have primary hyperparathyroidism; (5) On osteoporosis medications and need to assess response to drug therapy  · Last bone density test (DXA Scan): Not on file  5  HIV Screening: covered annually if you're between the age of 12-76  Also covered annually if you are younger than 13 and older than 72 with risk factors for HIV infection  For pregnant patients, it is covered up to 3 times per pregnancy  Immunizations:  Immunization Recommendations   Influenza Vaccine Annual influenza vaccination during flu season is recommended for all persons aged >= 6 months who do not have contraindications   Pneumococcal Vaccine (Prevnar and Pneumovax)  * Prevnar = PCV13  * Pneumovax = PPSV23   Adults 25-60 years old: 1-3 doses may be recommended based on certain risk factors  Adults 72 years old: Prevnar (PCV13) vaccine recommended followed by Pneumovax (PPSV23) vaccine  If already received PPSV23 since turning 65, then PCV13 recommended at least one year after PPSV23 dose  Hepatitis B Vaccine 3 dose series if at intermediate or high risk (ex: diabetes, end stage renal disease, liver disease)   Tetanus (Td) Vaccine - COST NOT COVERED BY MEDICARE PART B Following completion of primary series, a booster dose should be given every 10 years to maintain immunity against tetanus  Td may also be given as tetanus wound prophylaxis  Tdap Vaccine - COST NOT COVERED BY MEDICARE PART B Recommended at least once for all adults  For pregnant patients, recommended with each pregnancy  Shingles Vaccine (Shingrix) - COST NOT COVERED BY MEDICARE PART B  2 shot series recommended in those aged 48 and above     Health Maintenance Due:      Topic Date Due    Hepatitis C Screening  1955    MAMMOGRAM  1955    CRC Screening: Colonoscopy  1955     Immunizations Due:      Topic Date Due    DTaP,Tdap,and Td Vaccines (1 - Tdap) 09/20/1975    INFLUENZA VACCINE  07/01/2019     Advance Directives   What are advance directives?   Advance directives are legal documents that state your wishes and plans for medical care  These plans are made ahead of time in case you lose your ability to make decisions for yourself  Advance directives can apply to any medical decision, such as the treatments you want, and if you want to donate organs  What are the types of advance directives? There are many types of advance directives, and each state has rules about how to use them  You may choose a combination of any of the following:  · Living will: This is a written record of the treatment you want  You can also choose which treatments you do not want, which to limit, and which to stop at a certain time  This includes surgery, medicine, IV fluid, and tube feedings  · Durable power of  for healthcare [facility]): This is a written record that states who you want to make healthcare choices for you when you are unable to make them for yourself  This person, called a proxy, is usually a family member or a friend  You may choose more than 1 proxy  · Do not resuscitate (DNR) order:  A DNR order is used in case your heart stops beating or you stop breathing  It is a request not to have certain forms of treatment, such as CPR  A DNR order may be included in other types of advance directives  · Medical directive: This covers the care that you want if you are in a coma, near death, or unable to make decisions for yourself  You can list the treatments you want for each condition  Treatment may include pain medicine, surgery, blood transfusions, dialysis, IV or tube feedings, and a ventilator (breathing machine)  · Values history: This document has questions about your views, beliefs, and how you feel and think about life  This information can help others choose the care that you would choose  Why are advance directives important? An advance directive helps you control your care  Although spoken wishes may be used, it is better to have your wishes written down  Spoken wishes can be misunderstood, or not followed   Treatments may be given even if you do not want them  An advance directive may make it easier for your family to make difficult choices about your care  Weight Management   Why it is important to manage your weight:  Being overweight increases your risk of health conditions such as heart disease, high blood pressure, type 2 diabetes, and certain types of cancer  It can also increase your risk for osteoarthritis, sleep apnea, and other respiratory problems  Aim for a slow, steady weight loss  Even a small amount of weight loss can lower your risk of health problems  How to lose weight safely:  A safe and healthy way to lose weight is to eat fewer calories and get regular exercise  You can lose up about 1 pound a week by decreasing the number of calories you eat by 500 calories each day  Healthy meal plan for weight management:  A healthy meal plan includes a variety of foods, contains fewer calories, and helps you stay healthy  A healthy meal plan includes the following:  · Eat whole-grain foods more often  A healthy meal plan should contain fiber  Fiber is the part of grains, fruits, and vegetables that is not broken down by your body  Whole-grain foods are healthy and provide extra fiber in your diet  Some examples of whole-grain foods are whole-wheat breads and pastas, oatmeal, brown rice, and bulgur  · Eat a variety of vegetables every day  Include dark, leafy greens such as spinach, kale, marc greens, and mustard greens  Eat yellow and orange vegetables such as carrots, sweet potatoes, and winter squash  · Eat a variety of fruits every day  Choose fresh or canned fruit (canned in its own juice or light syrup) instead of juice  Fruit juice has very little or no fiber  · Eat low-fat dairy foods  Drink fat-free (skim) milk or 1% milk  Eat fat-free yogurt and low-fat cottage cheese  Try low-fat cheeses such as mozzarella and other reduced-fat cheeses  · Choose meat and other protein foods that are low in fat  Choose beans or other legumes such as split peas or lentils  Choose fish, skinless poultry (chicken or turkey), or lean cuts of red meat (beef or pork)  Before you cook meat or poultry, cut off any visible fat  · Use less fat and oil  Try baking foods instead of frying them  Add less fat, such as margarine, sour cream, regular salad dressing and mayonnaise to foods  Eat fewer high-fat foods  Some examples of high-fat foods include french fries, doughnuts, ice cream, and cakes  · Eat fewer sweets  Limit foods and drinks that are high in sugar  This includes candy, cookies, regular soda, and sweetened drinks  Exercise:  Exercise at least 30 minutes per day on most days of the week  Some examples of exercise include walking, biking, dancing, and swimming  You can also fit in more physical activity by taking the stairs instead of the elevator or parking farther away from stores  Ask your healthcare provider about the best exercise plan for you  © Copyright [employer] 2018 Information is for End User's use only and may not be sold, redistributed or otherwise used for commercial purposes   All illustrations and images included in CareNotes® are the copyrighted property of A D A M , [employer]  or [address] [place] [address] [facility]

## 2019-09-17 NOTE — PROGRESS NOTES
Assessment/Plan:  1  DDD (degenerative disc disease), lumbar  Pain well controlled with ultram as needed  Will return to Dr Flex Jolley for injections  Had her sign controlled substance agreement today and will jackson get ultram from him again when she returns to him  Risks and benefits of ultram reviewed along with how to take it  Controlled substance agreement reviewed  Do not mix with Fiorcet  - traMADol (ULTRAM) 50 mg tablet; Take 1 tablet (50 mg total) by mouth 2 (two) times a day as needed for moderate pain  Dispense: 90 tablet; Refill: 0    2  Intervertebral disc disorder with radiculopathy of lumbar region  See above    3  Osteoporosis of lumbar spine  Pt declines any meds for this or any further testing  4  Migraine without aura and without status migrainosus, not intractable  Uses fiorcet rarely  Last prescription was > 2 years ago  PDMP reviewed and no red flags  Risks and benefits and side effects reviewed  Prescription:  - Butalbital-APAP-Caffeine (FIORICET) -40 MG CAPS; Take 1 tablet by mouth as needed (migraine)  Dispense: 30 capsule; Refill: 1    5  Fibromyalgia  Improved since menopause  6  Generalized convulsive epilepsy ([facility] [address] )  No concerns  7  Vitamin D deficiency  Update labs   - Vitamin D 25 hydroxy; Future    8  IFG (impaired fasting glucose)  Update labs   - Comprehensive metabolic panel; Future  - Hemoglobin A1C; Future    9  Lipid screening  Update labs   - Lipid panel; Future    10  Need for hepatitis C screening test  Agrees with labs   - Hepatitis C antibody; Future    11  Other ulcerative colitis with complication [facility])  Reminded importance of updated colonscopy  She agrees  She thinks her last was at [facility] [place] maybe 10 years ago  Denies any UC flares  - Ambulatory referral to Colorectal Surgery; Future    12  Kyphoscoliosis  Stable  Denies pain from this         Health Maintenance Due   Topic Date Due    Hepatitis C Screening  1955   Eugenie Rob Medicare Annual Wellness Visit (AWV)  1955    MAMMOGRAM  1955    CRC Screening: Colonoscopy  1954    BMI: Followup Plan  09/20/1972    DTaP,Tdap,and Td Vaccines (1 - Tdap) 09/20/1975    INFLUENZA VACCINE  07/01/2019      Reviewed appropriate diabetic & cardiovascular screening and prevention  Reviewed age appropriate cancer screenings and pros and cons  - she agrees to colonoscopy but declines mammo  She agrees to update pap  Last was normal    Reviewed bloodwork & immunizations that are appropriate for this patient  - she declines immunizations but agrees to blood work  Advanced care planning was reviewed  She has an advanced directive and will bring in paperwork for us  After reviews of risks, benefits of above ordered appropriate tests that pt agrees to  BMI Counseling: Body mass index is 27 95 kg/m²  Discussed the patient's BMI with her  The BMI is above normal  Nutrition recommendations include 3-5 servings of fruits/vegetables daily  Exercise recommendations include exercising 3-5 times per week  Return in about 3 months (around 12/17/2019) for pap  Patient Instructions     Chair yoga   Check out Silver sneakers     If you want to go to PT call and ask for a referral:   Claire Hines [id number] [facility] location [address] [place] [address], [address], [address]    [facility] [place] [address] [facility] [address]             [facility] Preventive Visit Patient Instructions  Thank you for completing your Welcome to Medicare Visit or Medicare Annual Wellness Visit today  Your next wellness visit will be due in one year (9/17/2020)  The screening/preventive services that you may require over the next 5-10 years are detailed below  Some tests may not apply to you based off risk factors and/or age   Screening tests ordered at today's visit but not completed yet may show as past due  Also, please note that scanned in results may not display below  Preventive Screenings:  Service Recommendations Previous Testing/Comments   Colorectal Cancer Screening  * Colonoscopy    * Fecal Occult Blood Test (FOBT)/Fecal Immunochemical Test (FIT)  * Fecal DNA/Cologuard Test  * Flexible Sigmoidoscopy Age: 54-65 years old   Colonoscopy: every 10 years (may be performed more frequently if at higher risk)  OR  FOBT/FIT: every 1 year  OR  Cologuard: every 3 years  OR  Sigmoidoscopy: every 5 years  Screening may be recommended earlier than age 48 if at higher risk for colorectal cancer  Also, an individualized decision between you and your healthcare provider will decide whether screening between the ages of 74-80 would be appropriate  Colonoscopy: Not on file  FOBT/FIT: Not on file  Cologuard: Not on file  Sigmoidoscopy: Not on file         Breast Cancer Screening Age: 36 years old  Frequency: every 1-2 years  Not required if history of left and right mastectomy Mammogram: Not on file       Cervical Cancer Screening Between the ages of 21-29, pap smear recommended once every 3 years  Between the ages of 33-67, can perform pap smear with HPV co-testing every 5 years  Recommendations may differ for women with a history of total hysterectomy, cervical cancer, or abnormal pap smears in past  Pap Smear: Not on file       Hepatitis C Screening Once for adults born between 1945 and 1965  More frequently in patients at high risk for Hepatitis C Hep C Antibody: Not on file       Diabetes Screening 1-2 times per year if you're at risk for diabetes or have pre-diabetes Fasting glucose: No results in last 5 years   A1C: No results in last 5 years       Cholesterol Screening Once every 5 years if you don't have a lipid disorder  May order more often based on risk factors  Lipid panel: Not on file         Other Preventive Screenings Covered by Medicare:  1   Abdominal Aortic Aneurysm (AAA) Screening: covered once if your at risk  You're considered to be at risk if you have a family history of AAA  2  Lung Cancer Screening: covers low dose CT scan once per year if you meet all of the following conditions: (1) Age 50-69; (2) No signs or symptoms of lung cancer; (3) Current smoker or have quit smoking within the last 15 years; (4) You have a tobacco smoking history of at least 30 pack years (packs per day multiplied by number of years you smoked); (5) You get a written order from a healthcare provider  3  Glaucoma Screening: covered annually if you're considered high risk: (1) You have diabetes OR (2) Family history of glaucoma OR (3)  aged 48 and older OR (3)  American aged 72 and older  3  Osteoporosis Screening: covered every 2 years if you meet one of the following conditions: (1) You're estrogen deficient and at risk for osteoporosis based off medical history and other findings; (2) Have a vertebral abnormality; (3) On glucocorticoid therapy for more than 3 months; (4) Have primary hyperparathyroidism; (5) On osteoporosis medications and need to assess response to drug therapy  · Last bone density test (DXA Scan): Not on file  5  HIV Screening: covered annually if you're between the age of 12-76  Also covered annually if you are younger than 13 and older than 72 with risk factors for HIV infection  For pregnant patients, it is covered up to 3 times per pregnancy  Immunizations:  Immunization Recommendations   Influenza Vaccine Annual influenza vaccination during flu season is recommended for all persons aged >= 6 months who do not have contraindications   Pneumococcal Vaccine (Prevnar and Pneumovax)  * Prevnar = PCV13  * Pneumovax = PPSV23   Adults 25-60 years old: 1-3 doses may be recommended based on certain risk factors  Adults 72 years old: Prevnar (PCV13) vaccine recommended followed by Pneumovax (PPSV23) vaccine   If already received PPSV23 since turning 65, then PCV13 recommended at least one year after PPSV23 dose  Hepatitis B Vaccine 3 dose series if at intermediate or high risk (ex: diabetes, end stage renal disease, liver disease)   Tetanus (Td) Vaccine - COST NOT COVERED BY MEDICARE PART B Following completion of primary series, a booster dose should be given every 10 years to maintain immunity against tetanus  Td may also be given as tetanus wound prophylaxis  Tdap Vaccine - COST NOT COVERED BY MEDICARE PART B Recommended at least once for all adults  For pregnant patients, recommended with each pregnancy  Shingles Vaccine (Shingrix) - COST NOT COVERED BY MEDICARE PART B  2 shot series recommended in those aged 48 and above     Health Maintenance Due:      Topic Date Due    Hepatitis C Screening  1955    MAMMOGRAM  1955    CRC Screening: Colonoscopy  1955     Immunizations Due:      Topic Date Due    DTaP,Tdap,and Td Vaccines (1 - Tdap) 09/20/1975    INFLUENZA VACCINE  07/01/2019     Advance Directives   What are advance directives? Advance directives are legal documents that state your wishes and plans for medical care  These plans are made ahead of time in case you lose your ability to make decisions for yourself  Advance directives can apply to any medical decision, such as the treatments you want, and if you want to donate organs  What are the types of advance directives? There are many types of advance directives, and each state has rules about how to use them  You may choose a combination of any of the following:  · Living will: This is a written record of the treatment you want  You can also choose which treatments you do not want, which to limit, and which to stop at a certain time  This includes surgery, medicine, IV fluid, and tube feedings  · Durable power of  for healthcare [place] [facility]): This is a written record that states who you want to make healthcare choices for you when you are unable to make them for yourself   This person, called a proxy, is usually a family member or a friend  You may choose more than 1 proxy  · Do not resuscitate (DNR) order:  A DNR order is used in case your heart stops beating or you stop breathing  It is a request not to have certain forms of treatment, such as CPR  A DNR order may be included in other types of advance directives  · Medical directive: This covers the care that you want if you are in a coma, near death, or unable to make decisions for yourself  You can list the treatments you want for each condition  Treatment may include pain medicine, surgery, blood transfusions, dialysis, IV or tube feedings, and a ventilator (breathing machine)  · Values history: This document has questions about your views, beliefs, and how you feel and think about life  This information can help others choose the care that you would choose  Why are advance directives important? An advance directive helps you control your care  Although spoken wishes may be used, it is better to have your wishes written down  Spoken wishes can be misunderstood, or not followed  Treatments may be given even if you do not want them  An advance directive may make it easier for your family to make difficult choices about your care  Weight Management   Why it is important to manage your weight:  Being overweight increases your risk of health conditions such as heart disease, high blood pressure, type 2 diabetes, and certain types of cancer  It can also increase your risk for osteoarthritis, sleep apnea, and other respiratory problems  Aim for a slow, steady weight loss  Even a small amount of weight loss can lower your risk of health problems  How to lose weight safely:  A safe and healthy way to lose weight is to eat fewer calories and get regular exercise  You can lose up about 1 pound a week by decreasing the number of calories you eat by 500 calories each day     Healthy meal plan for weight management:  A healthy meal plan includes a variety of foods, contains fewer calories, and helps you stay healthy  A healthy meal plan includes the following:  · Eat whole-grain foods more often  A healthy meal plan should contain fiber  Fiber is the part of grains, fruits, and vegetables that is not broken down by your body  Whole-grain foods are healthy and provide extra fiber in your diet  Some examples of whole-grain foods are whole-wheat breads and pastas, oatmeal, brown rice, and bulgur  · Eat a variety of vegetables every day  Include dark, leafy greens such as spinach, kale, marc greens, and mustard greens  Eat yellow and orange vegetables such as carrots, sweet potatoes, and winter squash  · Eat a variety of fruits every day  Choose fresh or canned fruit (canned in its own juice or light syrup) instead of juice  Fruit juice has very little or no fiber  · Eat low-fat dairy foods  Drink fat-free (skim) milk or 1% milk  Eat fat-free yogurt and low-fat cottage cheese  Try low-fat cheeses such as mozzarella and other reduced-fat cheeses  · Choose meat and other protein foods that are low in fat  Choose beans or other legumes such as split peas or lentils  Choose fish, skinless poultry (chicken or turkey), or lean cuts of red meat (beef or pork)  Before you cook meat or poultry, cut off any visible fat  · Use less fat and oil  Try baking foods instead of frying them  Add less fat, such as margarine, sour cream, regular salad dressing and mayonnaise to foods  Eat fewer high-fat foods  Some examples of high-fat foods include french fries, doughnuts, ice cream, and cakes  · Eat fewer sweets  Limit foods and drinks that are high in sugar  This includes candy, cookies, regular soda, and sweetened drinks  Exercise:  Exercise at least 30 minutes per day on most days of the week  Some examples of exercise include walking, biking, dancing, and swimming   You can also fit in more physical activity by taking the stairs instead of the elevator or parking farther away from stores  Ask your healthcare provider about the best exercise plan for you  © Copyright [employer] 2018 Information is for End User's use only and may not be sold, redistributed or otherwise used for commercial purposes  All illustrations and images included in CareNotes® are the copyrighted property of [employer]  or [employer]         Subjective:   John Cortez is a 58 y o  female here today for a follow-up on her current medical conditions:  Patient Active Problem List   Diagnosis    Intervertebral disc disorder with radiculopathy of lumbar region    Osteoporosis of lumbar spine    DDD (degenerative disc disease), lumbar    Fibromyalgia    Generalized convulsive epilepsy ([address] )    Kyphoscoliosis    Migraine without aura    Varicose veins    Vitamin D deficiency    Ulcerative colitis ([facility] [address] )          Current Outpatient Medications   Medication Sig Dispense Refill    Magnesium Oxide 400 MG CAPS Take one tablet by mouth daily      Multiple Vitamins-Minerals (MULTIVITAMIN ADULTS PO) Reported on 8/5/2017      traMADol (ULTRAM) 50 mg tablet Take 1 tablet (50 mg total) by mouth 2 (two) times a day as needed for moderate pain 90 tablet 0    Butalbital-APAP-Caffeine (FIORICET) -40 MG CAPS Take 1 tablet by mouth as needed (migraine) 30 capsule 1     No current facility-administered medications for this visit  HPI:  Chief Complaint   Patient presents with    Medicare Wellness Visit     welcome to medicare,      -- Above per clinical staff and reviewed  --    PHQ-9 Depression Screening    PHQ-9:    Frequency of the following problems over the past two weeks:            used to see patient in 2013   rheum Maya Garcia   cmp lipids vit D HbA1C   elevated bp  stress  UC was referred to GI   Today:  Mortazzavi cortisone shots were working 3 -4   Had one here and did not work   Ultram for pain  takes it as needed - driving   2-3 times a week   Takes 1 -2 tablets  Laying down helps pain  Tens machine does not help much  Ointment with albino tumeric CBD   Arthritis right wrist and tthumb - magnet  Arthritis in right hip and   Gardening   advil 1 -2 tabelts, 2 -3 times a week   FM no longer a bother, migraines less     Last migraine - can be smelled triggered, stress, sun  Not as bothered by light or noise not as bad  Terrible pain 7/10  No nausea  Fiorcet helps in 1 -2 hours  Last physical therapy made it worse - that was last in May  Some yoga does help  Curves when it was open  heatlhy diet   UC - had colonoscopy 10 years - women, twin sisters, at [address] [facility]  Declines mammo - painful     DEXA 2013 - using supplements           The following portions of the patient's history were reviewed and updated as appropriate: allergies, current medications, past family history, past medical history, past social history, past surgical history and problem list     Objective:  Vitals:  /78   Pulse 62   Temp 98 9 °F (37 2 °C) (Tympanic)   Resp 16   Ht 5' 5 35" (1 66 m)   Wt 77 kg (169 lb 12 8 oz)   SpO2 98%   BMI 27 95 kg/m²    Wt Readings from Last 3 Encounters:   09/17/19 77 kg (169 lb 12 8 oz)   08/27/19 78 5 kg (173 lb)   07/24/19 78 5 kg (173 lb)      BP Readings from Last 3 Encounters:   09/17/19 124/78   08/27/19 126/84   07/24/19 124/86        Review of Systems   She has no other concerns  No unexpected weight changes  No chest pain, SOB, or palpitations  No GERD  No changes in bowels or bladder  Sleeping well  No mood changes  Physical Exam   Constitutional: Appears well-developed and well-nourished  HENT: Head: Normocephalic  Sclera clear  Right Ear: External ear normal  Tympanic membrane normal    Left Ear: External ear normal  Tympanic membrane normal    Nose: Nose normal  No mucosal edema, No rhinorrhea  Mouth/Throat: Oropharynx is clear and moist   Eyes: Normal conjunctiva   No erythema  No discharge  Neck: Neck supple   No thyromegaly  Cardiovascular: Normal rate, regular rhythm and normal heart sounds  No murmur  Pulmonary/Chest: Effort normal and breath sounds normal  No wheezes, rales, or rhonchi  Abdominal: Soft  Bowel sounds are normal  There is no tenderness  No hepatosplenomegaly  Musculoskeletal: No lower extremity edema  + large kyphosclosis  Lymphadenopathy: No cervical adenopathy  Neurological: Alert and oriented to person, place, and time  Skin: Skin is warm and dry  No rash or lesions on scalp  Psychiatric: Normal mood and affect   Behavior is normal  Thought content normal

## 2019-09-24 DIAGNOSIS — G43.009 MIGRAINE WITHOUT AURA AND WITHOUT STATUS MIGRAINOSUS, NOT INTRACTABLE: ICD-10-CM

## 2019-09-24 RX ORDER — BUTALBITAL, ACETAMINOPHEN AND CAFFEINE 50; 325; 40 MG/1; MG/1; MG/1
TABLET ORAL
Qty: 30 TABLET | OUTPATIENT
Start: 2019-09-24

## 2019-09-30 DIAGNOSIS — E55.9 VITAMIN D DEFICIENCY: Primary | ICD-10-CM

## 2019-09-30 LAB
HBA1C MFR BLD HPLC: 5.7 %
HCV AB SER-ACNC: NEGATIVE

## 2019-10-03 RX ORDER — ERGOCALCIFEROL 1.25 MG/1
50000 CAPSULE ORAL WEEKLY
Qty: 12 CAPSULE | Refills: 0 | Status: SHIPPED | OUTPATIENT
Start: 2019-10-03 | End: 2019-12-17 | Stop reason: SDUPTHER

## 2019-12-17 ENCOUNTER — ANNUAL EXAM (OUTPATIENT)
Dept: FAMILY MEDICINE CLINIC | Facility: CLINIC | Age: 64
End: 2019-12-17
Payer: MEDICARE

## 2019-12-17 VITALS
BODY MASS INDEX: 26.68 KG/M2 | HEART RATE: 58 BPM | DIASTOLIC BLOOD PRESSURE: 84 MMHG | HEIGHT: 66 IN | TEMPERATURE: 98.5 F | SYSTOLIC BLOOD PRESSURE: 128 MMHG | WEIGHT: 166 LBS | OXYGEN SATURATION: 96 % | RESPIRATION RATE: 12 BRPM

## 2019-12-17 DIAGNOSIS — Z12.31 SCREENING MAMMOGRAM, ENCOUNTER FOR: ICD-10-CM

## 2019-12-17 DIAGNOSIS — Z01.419 ENCOUNTER FOR ROUTINE GYNECOLOGIC EXAMINATION IN MEDICARE PATIENT: Primary | ICD-10-CM

## 2019-12-17 DIAGNOSIS — E55.9 VITAMIN D DEFICIENCY: ICD-10-CM

## 2019-12-17 DIAGNOSIS — Z78.0 POST-MENOPAUSAL: ICD-10-CM

## 2019-12-17 DIAGNOSIS — Z13.220 LIPID SCREENING: ICD-10-CM

## 2019-12-17 DIAGNOSIS — R73.01 IFG (IMPAIRED FASTING GLUCOSE): ICD-10-CM

## 2019-12-17 PROCEDURE — 99214 OFFICE O/P EST MOD 30 MIN: CPT | Performed by: FAMILY MEDICINE

## 2019-12-17 PROCEDURE — 87624 HPV HI-RISK TYP POOLED RSLT: CPT | Performed by: FAMILY MEDICINE

## 2019-12-17 PROCEDURE — G0145 SCR C/V CYTO,THINLAYER,RESCR: HCPCS | Performed by: FAMILY MEDICINE

## 2019-12-17 RX ORDER — MELOXICAM 7.5 MG/1
7.5 TABLET ORAL ONCE AS NEEDED
Refills: 0 | COMMUNITY
Start: 2019-11-13 | End: 2020-10-02 | Stop reason: ALTCHOICE

## 2019-12-17 RX ORDER — ERGOCALCIFEROL 1.25 MG/1
50000 CAPSULE ORAL WEEKLY
Qty: 12 CAPSULE | Refills: 0 | Status: SHIPPED | OUTPATIENT
Start: 2019-12-17 | End: 2020-10-02 | Stop reason: ALTCHOICE

## 2019-12-17 NOTE — PATIENT INSTRUCTIONS
Your vitamin D level is low  This is not a vitamin that is well absorbed, it cannot be found in high doses in most foods, and sun exposure is not enough to correct the level  For this you should start an prescription for vitamin D 50,000 iu ONCE A WEEK for 12 weeks  This will not cure your low level, but it will help get it back to normal  After you complete this prescription you should repeat the blood work (you do not need to fast)  With that level we can tell you how much over-the-counter dosing you should take in order to maintain this level

## 2019-12-17 NOTE — PROGRESS NOTES
Assessment/Plan:      1  Encounter for routine gynecologic examination in Medicare patient  See below  Agrees this will be her last pap if normal    - Liquid-based pap, screening  - HPV High Risk; Future  - HPV High Risk    2  Vitamin D deficiency  Did not get last message to take prescription for vit D  Sent in now  Start over the counter once prescription is complete  - Vitamin D 25 hydroxy; Future  - ergocalciferol (VITAMIN D2) 50,000 units; Take 1 capsule (50,000 Units total) by mouth once a week for 12 doses  Dispense: 12 capsule; Refill: 0    3  IFG (impaired fasting glucose)  Work on diet changes to help, reviewed carbs especially  - Hemoglobin A1C; Future    4  Lipid screening  Update labs  - Comprehensive metabolic panel; Future  - Lipid panel; Future  - LDL cholesterol, direct; Future    5  Screening mammogram, encounter for  Has not had this but today does agree to get one   - Mammo screening bilateral w cad; Future    6  Post-menopausal  Also agrees to update dexa   - DXA bone density spine hip and pelvis; Future        ·         Reviewed importance of yearly mammogram for women over 40   Reviewed ACOG pap guidelines   -Women aged 2129 years should have a Pap test alone every 3 years  HPV testing is not recommended    -Women aged 3568 years should have a Pap test and an HPV test (co-testing) every 5 years (preferred)  It also is acceptable to have a Pap test alone every 3 years  This wll be her last pap  ·         Reviewed healthy diet, exercise, adequate calcium and vitamin D  Return in about 9 months (around 9/17/2020) for Medicare Wellness Visit  Subjective:    CATY Rosenbaum is a 71 y o  female who presents today for pap smear/GYN exam    Chief Complaint   Patient presents with    Gynecologic Exam     ---Above per clinical staff & reviewed  ---  see Sept fbw - vit D low   18  chol 222 112 HDL 77 LDL  123   A1C 5 7   due for UDS   dexa last 2013   HIV   2010no  High risk HPV   declines mammo    She thinks she had an abnormal in past and then a scrapping (results from 2005, 2007, 2010 2013 all normal - cone biopsy 2002)    Post-menopausal    Gynecologic History  No LMP recorded  Menopause around 49  No itching or burning   Not having sex   Contraception: none  Last Pap:2013  Results: normal  History of abnormal paps? no  Last Mammogram: Not on file - pt declined  She is now considering one  Agrees to get colonoscopy also now  Obstetric History  OB History   No data available        Vaginal discharge: No  Other concerns: none  Not SA  The following portions of the patient's history were reviewed and updated as appropriate: allergies, current medications, past family history, past medical history, past social history, past surgical history and problem list   Current Outpatient Medications   Medication Sig Dispense Refill    Butalbital-APAP-Caffeine (FIORICET) -40 MG CAPS Take 1 tablet by mouth as needed (migraine) 30 capsule 1    ergocalciferol (VITAMIN D2) 50,000 units Take 1 capsule (50,000 Units total) by mouth once a week for 12 doses 12 capsule 0    Magnesium Oxide 400 MG CAPS Take one tablet by mouth daily      meloxicam (MOBIC) 7 5 mg tablet Take 7 5 mg by mouth once as needed  0    Multiple Vitamins-Minerals (MULTIVITAMIN ADULTS PO) Reported on 8/5/2017      traMADol (ULTRAM) 50 mg tablet Take 1 tablet (50 mg total) by mouth 2 (two) times a day as needed for moderate pain 90 tablet 0     No current facility-administered medications for this visit  Review of Systems  ROS:  all others negative - no chest pain, SOB, normal urine and bowels  no GERD  sleeping well  mood good  Objective:    /84   Pulse 58   Temp 98 5 °F (36 9 °C)   Resp 12   Ht 5' 6" (1 676 m)   Wt 75 3 kg (166 lb)   SpO2 96%   BMI 26 79 kg/m²   Physical Exam   Physical Exam:  General:  well developed, well nourished, in no acute distress      Neck:  no masses, thyromegaly, or abnormal cervical nodes, no carotid bruit  Breasts:  no masses, adenopathy or nipple discharge  Lungs:  clear bilaterally to auscultation  Heart:  regular rate and rhythm, S1, S2 without murmurs, rubs, or gallops  Abdomen:   normal bowel sounds; soft non-tender, no hepatosplenomegaly, or masses noted  Psych:  alert and cooperative; normal mood and affect; normal attention span and concentration  Extremities:  no edema  Pelvic Exam  Vulva:       normal appearance, normal hair distribution, no lesions or masses  Urethra and Bladder:       Urethra--normal, no discharge  Bladder--normal, non-tender, non-distended  Vagina:       normal, ruggated, physiologic discharge, no lesions, no masses, no cystocele, adequate pelvic support  Cervix:       normal, midposition, no CMT, no lesions  Uterus:       smooth, anteverted, anteflexed, mobile, non-tender, firm, adequate support, no prolapse  Adnexa:       normal, no masses bilaterally, mobile bilaterally, nontender bilaterally

## 2019-12-18 LAB
HPV HR 12 DNA CVX QL NAA+PROBE: NEGATIVE
HPV16 DNA CVX QL NAA+PROBE: NEGATIVE
HPV18 DNA CVX QL NAA+PROBE: NEGATIVE

## 2019-12-20 LAB
LAB AP GYN PRIMARY INTERPRETATION: NORMAL
Lab: NORMAL

## 2020-01-09 NOTE — RESULT ENCOUNTER NOTE
Called and spoke with patient, patient notified of Dr. Greenwood's response to earlier message.  Patient states that he will purchase a blood pressure cuff himself.   See portal note

## 2020-02-05 ENCOUNTER — TELEPHONE (OUTPATIENT)
Dept: FAMILY MEDICINE CLINIC | Facility: CLINIC | Age: 65
End: 2020-02-05

## 2020-02-05 NOTE — TELEPHONE ENCOUNTER
Patient called and stated that she received a bill from Medicare regarding her lipid panel that was done in 8/2019 and denied by them  They are going to be sending her a form to be completed as to why the lipid panel was ordered  I discussed with the patient that when she receives the form she should bring it into the office for me and I will have Dr Tang Laughter look at it and complete the correct information as to why it was ordered

## 2020-02-28 NOTE — TELEPHONE ENCOUNTER
Patient dropped off form and all information is correct  The form needs to be received to Medicare by March 19, 2020, and the address is on the form  When the form is completed please return to me and I will scan into chart and mail back to Medicare   The form is placed in Dr Araceli Epps red folder

## 2020-03-06 ENCOUNTER — TELEPHONE (OUTPATIENT)
Dept: FAMILY MEDICINE CLINIC | Facility: CLINIC | Age: 65
End: 2020-03-06

## 2020-03-06 NOTE — TELEPHONE ENCOUNTER
Patient called and questioned about her form, I placed the form in Dr Sowmya Otero folder when dropped off, please call patient to update progress on phone, she is concerned that they will receive it on time

## 2020-03-09 NOTE — TELEPHONE ENCOUNTER
Per Marcia Frankel, form was faxed, message was left on appeal line and original form was mailed to the address on the form  Placed call to patient and informed her of the update  Patient was happy and had no additional questions

## 2020-03-25 DIAGNOSIS — G43.009 MIGRAINE WITHOUT AURA AND WITHOUT STATUS MIGRAINOSUS, NOT INTRACTABLE: ICD-10-CM

## 2020-03-25 RX ORDER — BUTALBITAL, ACETAMINOPHEN AND CAFFEINE 50; 325; 40 MG/1; MG/1; MG/1
TABLET ORAL
Qty: 30 TABLET | OUTPATIENT
Start: 2020-03-25

## 2020-03-25 RX ORDER — BUTALBITAL, ACETAMINOPHEN AND CAFFEINE 300; 40; 50 MG/1; MG/1; MG/1
1 CAPSULE ORAL AS NEEDED
Qty: 30 CAPSULE | Refills: 2 | Status: SHIPPED | OUTPATIENT
Start: 2020-03-25 | End: 2021-03-26 | Stop reason: SDUPTHER

## 2020-03-25 NOTE — TELEPHONE ENCOUNTER
[facility]40mg[facility]  She would like to pick the medication up on Friday  Please send her my chart message when order is sent

## 2020-03-25 NOTE — TELEPHONE ENCOUNTER
Medication refill received from [facility]  Please let the patient know that we do not accept refills directly from the pharmacy

## 2020-03-26 ENCOUNTER — TELEPHONE (OUTPATIENT)
Dept: FAMILY MEDICINE CLINIC | Facility: CLINIC | Age: 65
End: 2020-03-26

## 2020-03-26 NOTE — TELEPHONE ENCOUNTER
Placed call topharmacy  Confirmed okat for tablets vs capsules  Patient had a history of tablet use

## 2020-08-03 PROBLEM — Z12.11 SCREENING FOR MALIGNANT NEOPLASM OF COLON: Status: ACTIVE | Noted: 2020-08-03

## 2020-09-18 ENCOUNTER — ANESTHESIA EVENT (OUTPATIENT)
Dept: GASTROENTEROLOGY | Facility: AMBULARY SURGERY CENTER | Age: 65
End: 2020-09-18

## 2020-10-02 ENCOUNTER — HOSPITAL ENCOUNTER (OUTPATIENT)
Dept: GASTROENTEROLOGY | Facility: AMBULARY SURGERY CENTER | Age: 65
Setting detail: OUTPATIENT SURGERY
Discharge: HOME | End: 2020-10-02
Attending: COLON & RECTAL SURGERY
Payer: MEDICARE

## 2020-10-02 ENCOUNTER — ANESTHESIA (OUTPATIENT)
Dept: GASTROENTEROLOGY | Facility: AMBULARY SURGERY CENTER | Age: 65
End: 2020-10-02

## 2020-10-02 VITALS
OXYGEN SATURATION: 98 % | SYSTOLIC BLOOD PRESSURE: 129 MMHG | HEIGHT: 66 IN | DIASTOLIC BLOOD PRESSURE: 61 MMHG | HEART RATE: 45 BPM | RESPIRATION RATE: 16 BRPM | TEMPERATURE: 98 F | BODY MASS INDEX: 24.91 KG/M2 | WEIGHT: 155 LBS

## 2020-10-02 VITALS — HEART RATE: 52 BPM

## 2020-10-02 DIAGNOSIS — K51.819 OTHER ULCERATIVE COLITIS WITH COMPLICATION (HCC): ICD-10-CM

## 2020-10-02 DIAGNOSIS — Z12.11 SCREENING FOR MALIGNANT NEOPLASM OF COLON: ICD-10-CM

## 2020-10-02 PROBLEM — K63.89 PROCTOSIGMOIDITIS: Status: ACTIVE | Noted: 2020-10-02

## 2020-10-02 PROCEDURE — 45380 COLONOSCOPY AND BIOPSY: CPT | Performed by: COLON & RECTAL SURGERY

## 2020-10-02 PROCEDURE — 88305 TISSUE EXAM BY PATHOLOGIST: CPT | Performed by: PATHOLOGY

## 2020-10-02 RX ORDER — PROPOFOL 10 MG/ML
INJECTION, EMULSION INTRAVENOUS CONTINUOUS PRN
Status: DISCONTINUED | OUTPATIENT
Start: 2020-10-02 | End: 2020-10-02

## 2020-10-02 RX ORDER — CHLORAL HYDRATE 500 MG
1000 CAPSULE ORAL DAILY
COMMUNITY

## 2020-10-02 RX ORDER — SODIUM CHLORIDE, SODIUM LACTATE, POTASSIUM CHLORIDE, CALCIUM CHLORIDE 600; 310; 30; 20 MG/100ML; MG/100ML; MG/100ML; MG/100ML
100 INJECTION, SOLUTION INTRAVENOUS CONTINUOUS
Status: DISCONTINUED | OUTPATIENT
Start: 2020-10-02 | End: 2020-10-06 | Stop reason: HOSPADM

## 2020-10-02 RX ORDER — PROPOFOL 10 MG/ML
INJECTION, EMULSION INTRAVENOUS AS NEEDED
Status: DISCONTINUED | OUTPATIENT
Start: 2020-10-02 | End: 2020-10-02

## 2020-10-02 RX ORDER — LIDOCAINE HYDROCHLORIDE 10 MG/ML
INJECTION, SOLUTION EPIDURAL; INFILTRATION; INTRACAUDAL; PERINEURAL AS NEEDED
Status: DISCONTINUED | OUTPATIENT
Start: 2020-10-02 | End: 2020-10-02

## 2020-10-02 RX ADMIN — SODIUM CHLORIDE, SODIUM LACTATE, POTASSIUM CHLORIDE, AND CALCIUM CHLORIDE: .6; .31; .03; .02 INJECTION, SOLUTION INTRAVENOUS at 10:10

## 2020-10-02 RX ADMIN — PROPOFOL 100 MG: 10 INJECTION, EMULSION INTRAVENOUS at 10:23

## 2020-10-02 RX ADMIN — PROPOFOL 120 MCG/KG/MIN: 10 INJECTION, EMULSION INTRAVENOUS at 10:26

## 2020-10-02 RX ADMIN — LIDOCAINE HYDROCHLORIDE 50 MG: 10 INJECTION, SOLUTION EPIDURAL; INFILTRATION; INTRACAUDAL; PERINEURAL at 10:23

## 2020-10-02 RX ADMIN — PROPOFOL 50 MG: 10 INJECTION, EMULSION INTRAVENOUS at 10:25

## 2020-10-02 RX ADMIN — PROPOFOL 100 MG: 10 INJECTION, EMULSION INTRAVENOUS at 10:29

## 2020-10-06 ENCOUNTER — HOSPITAL ENCOUNTER (OUTPATIENT)
Dept: CT IMAGING | Facility: HOSPITAL | Age: 65
Discharge: HOME | End: 2020-10-06
Attending: COLON & RECTAL SURGERY

## 2020-10-07 ENCOUNTER — HOSPITAL ENCOUNTER (OUTPATIENT)
Dept: CT IMAGING | Facility: HOSPITAL | Age: 65
Discharge: HOME | End: 2020-10-07
Attending: COLON & RECTAL SURGERY
Payer: MEDICARE

## 2020-10-07 PROCEDURE — G1004 CDSM NDSC: HCPCS

## 2020-10-07 PROCEDURE — 74177 CT ABD & PELVIS W/CONTRAST: CPT

## 2020-10-07 RX ADMIN — IOHEXOL 100 ML: 350 INJECTION, SOLUTION INTRAVENOUS at 09:38

## 2020-10-21 PROBLEM — D37.3 APPENDICEAL TUMOR: Status: ACTIVE | Noted: 2020-10-21

## 2020-10-23 ENCOUNTER — OFFICE VISIT (OUTPATIENT)
Dept: LAB | Facility: CLINIC | Age: 65
End: 2020-10-23
Payer: MEDICARE

## 2020-10-23 ENCOUNTER — TRANSCRIBE ORDERS (OUTPATIENT)
Dept: LAB | Facility: CLINIC | Age: 65
End: 2020-10-23

## 2020-10-23 ENCOUNTER — LAB (OUTPATIENT)
Dept: LAB | Facility: CLINIC | Age: 65
End: 2020-10-23
Payer: MEDICARE

## 2020-10-23 DIAGNOSIS — K51.819 OTHER ULCERATIVE COLITIS WITH UNSPECIFIED COMPLICATIONS (HCC): ICD-10-CM

## 2020-10-23 DIAGNOSIS — D37.3 APPENDICEAL TUMOR: ICD-10-CM

## 2020-10-23 DIAGNOSIS — K51.918: ICD-10-CM

## 2020-10-23 LAB
ANION GAP SERPL CALCULATED.3IONS-SCNC: 9 MMOL/L (ref 4–13)
APTT PPP: 25 SECONDS (ref 23–37)
BASOPHILS # BLD AUTO: 0.05 THOUSANDS/ΜL (ref 0–0.1)
BASOPHILS NFR BLD AUTO: 1 % (ref 0–1)
BUN SERPL-MCNC: 7 MG/DL (ref 5–25)
CALCIUM SERPL-MCNC: 9.4 MG/DL (ref 8.3–10.1)
CHLORIDE SERPL-SCNC: 107 MMOL/L (ref 100–108)
CO2 SERPL-SCNC: 25 MMOL/L (ref 21–32)
CREAT SERPL-MCNC: 0.69 MG/DL (ref 0.6–1.3)
EOSINOPHIL # BLD AUTO: 0.03 THOUSAND/ΜL (ref 0–0.61)
EOSINOPHIL NFR BLD AUTO: 1 % (ref 0–6)
ERYTHROCYTE [DISTWIDTH] IN BLOOD BY AUTOMATED COUNT: 14.4 % (ref 11.6–15.1)
GFR SERPL CREATININE-BSD FRML MDRD: 91 ML/MIN/1.73SQ M
GLUCOSE P FAST SERPL-MCNC: 106 MG/DL (ref 65–99)
HCT VFR BLD AUTO: 46.1 % (ref 34.8–46.1)
HGB BLD-MCNC: 15.2 G/DL (ref 11.5–15.4)
IMM GRANULOCYTES # BLD AUTO: 0.01 THOUSAND/UL (ref 0–0.2)
IMM GRANULOCYTES NFR BLD AUTO: 0 % (ref 0–2)
INR PPP: 0.98 (ref 0.84–1.19)
LYMPHOCYTES # BLD AUTO: 1.99 THOUSANDS/ΜL (ref 0.6–4.47)
LYMPHOCYTES NFR BLD AUTO: 31 % (ref 14–44)
MCH RBC QN AUTO: 31.2 PG (ref 26.8–34.3)
MCHC RBC AUTO-ENTMCNC: 33 G/DL (ref 31.4–37.4)
MCV RBC AUTO: 95 FL (ref 82–98)
MONOCYTES # BLD AUTO: 0.37 THOUSAND/ΜL (ref 0.17–1.22)
MONOCYTES NFR BLD AUTO: 6 % (ref 4–12)
NEUTROPHILS # BLD AUTO: 3.88 THOUSANDS/ΜL (ref 1.85–7.62)
NEUTS SEG NFR BLD AUTO: 61 % (ref 43–75)
NRBC BLD AUTO-RTO: 0 /100 WBCS
PLATELET # BLD AUTO: 275 THOUSANDS/UL (ref 149–390)
PMV BLD AUTO: 10.9 FL (ref 8.9–12.7)
POTASSIUM SERPL-SCNC: 5.4 MMOL/L (ref 3.5–5.3)
PROTHROMBIN TIME: 13.1 SECONDS (ref 11.6–14.5)
RBC # BLD AUTO: 4.87 MILLION/UL (ref 3.81–5.12)
SODIUM SERPL-SCNC: 141 MMOL/L (ref 136–145)
WBC # BLD AUTO: 6.33 THOUSAND/UL (ref 4.31–10.16)

## 2020-10-23 PROCEDURE — 80048 BASIC METABOLIC PNL TOTAL CA: CPT

## 2020-10-23 PROCEDURE — 85025 COMPLETE CBC W/AUTO DIFF WBC: CPT

## 2020-10-23 PROCEDURE — 36415 COLL VENOUS BLD VENIPUNCTURE: CPT

## 2020-10-23 PROCEDURE — 85610 PROTHROMBIN TIME: CPT

## 2020-10-23 PROCEDURE — 93005 ELECTROCARDIOGRAM TRACING: CPT

## 2020-10-23 PROCEDURE — 85730 THROMBOPLASTIN TIME PARTIAL: CPT

## 2020-10-23 PROCEDURE — U0003 INFECTIOUS AGENT DETECTION BY NUCLEIC ACID (DNA OR RNA); SEVERE ACUTE RESPIRATORY SYNDROME CORONAVIRUS 2 (SARS-COV-2) (CORONAVIRUS DISEASE [COVID-19]), AMPLIFIED PROBE TECHNIQUE, MAKING USE OF HIGH THROUGHPUT TECHNOLOGIES AS DESCRIBED BY CMS-2020-01-R: HCPCS | Performed by: COLON & RECTAL SURGERY

## 2020-10-24 LAB — SARS-COV-2 RNA SPEC QL NAA+PROBE: NOT DETECTED

## 2020-10-25 LAB
ATRIAL RATE: 49 BPM
P AXIS: 67 DEGREES
PR INTERVAL: 162 MS
QRS AXIS: 21 DEGREES
QRSD INTERVAL: 78 MS
QT INTERVAL: 444 MS
QTC INTERVAL: 401 MS
T WAVE AXIS: 59 DEGREES
VENTRICULAR RATE: 49 BPM

## 2020-10-25 PROCEDURE — 93010 ELECTROCARDIOGRAM REPORT: CPT | Performed by: INTERNAL MEDICINE

## 2020-10-27 ENCOUNTER — ANESTHESIA EVENT (OUTPATIENT)
Dept: PERIOP | Facility: HOSPITAL | Age: 65
DRG: 331 | End: 2020-10-27
Payer: MEDICARE

## 2020-10-30 ENCOUNTER — HOSPITAL ENCOUNTER (INPATIENT)
Facility: HOSPITAL | Age: 65
LOS: 1 days | Discharge: HOME | DRG: 331 | End: 2020-10-31
Attending: COLON & RECTAL SURGERY | Admitting: COLON & RECTAL SURGERY
Payer: MEDICARE

## 2020-10-30 ENCOUNTER — ANESTHESIA (OUTPATIENT)
Dept: PERIOP | Facility: HOSPITAL | Age: 65
DRG: 331 | End: 2020-10-30
Payer: MEDICARE

## 2020-10-30 VITALS — HEART RATE: 69 BPM

## 2020-10-30 DIAGNOSIS — D37.3 APPENDICEAL TUMOR: Primary | ICD-10-CM

## 2020-10-30 DIAGNOSIS — K51.819 OTHER ULCERATIVE COLITIS WITH UNSPECIFIED COMPLICATIONS (HCC): ICD-10-CM

## 2020-10-30 PROCEDURE — 44204 LAPARO PARTIAL COLECTOMY: CPT | Performed by: PHYSICIAN ASSISTANT

## 2020-10-30 PROCEDURE — 0DBH4ZZ EXCISION OF CECUM, PERCUTANEOUS ENDOSCOPIC APPROACH: ICD-10-PCS | Performed by: COLON & RECTAL SURGERY

## 2020-10-30 PROCEDURE — 88309 TISSUE EXAM BY PATHOLOGIST: CPT | Performed by: PATHOLOGY

## 2020-10-30 PROCEDURE — 44204 LAPARO PARTIAL COLECTOMY: CPT | Performed by: COLON & RECTAL SURGERY

## 2020-10-30 PROCEDURE — 0DTJ4ZZ RESECTION OF APPENDIX, PERCUTANEOUS ENDOSCOPIC APPROACH: ICD-10-PCS | Performed by: COLON & RECTAL SURGERY

## 2020-10-30 RX ORDER — LIDOCAINE HYDROCHLORIDE 10 MG/ML
INJECTION, SOLUTION EPIDURAL; INFILTRATION; INTRACAUDAL; PERINEURAL AS NEEDED
Status: DISCONTINUED | OUTPATIENT
Start: 2020-10-30 | End: 2020-10-30

## 2020-10-30 RX ORDER — FENTANYL CITRATE 50 UG/ML
INJECTION, SOLUTION INTRAMUSCULAR; INTRAVENOUS AS NEEDED
Status: DISCONTINUED | OUTPATIENT
Start: 2020-10-30 | End: 2020-10-30

## 2020-10-30 RX ORDER — ONDANSETRON 2 MG/ML
4 INJECTION INTRAMUSCULAR; INTRAVENOUS EVERY 6 HOURS PRN
Status: DISCONTINUED | OUTPATIENT
Start: 2020-10-30 | End: 2020-10-31 | Stop reason: HOSPADM

## 2020-10-30 RX ORDER — DEXAMETHASONE SODIUM PHOSPHATE 10 MG/ML
INJECTION, SOLUTION INTRAMUSCULAR; INTRAVENOUS AS NEEDED
Status: DISCONTINUED | OUTPATIENT
Start: 2020-10-30 | End: 2020-10-30

## 2020-10-30 RX ORDER — ONDANSETRON 2 MG/ML
INJECTION INTRAMUSCULAR; INTRAVENOUS AS NEEDED
Status: DISCONTINUED | OUTPATIENT
Start: 2020-10-30 | End: 2020-10-30

## 2020-10-30 RX ORDER — NEOSTIGMINE METHYLSULFATE 1 MG/ML
INJECTION INTRAVENOUS AS NEEDED
Status: DISCONTINUED | OUTPATIENT
Start: 2020-10-30 | End: 2020-10-30

## 2020-10-30 RX ORDER — LIDOCAINE HYDROCHLORIDE 10 MG/ML
0.5 INJECTION, SOLUTION EPIDURAL; INFILTRATION; INTRACAUDAL; PERINEURAL ONCE AS NEEDED
Status: COMPLETED | OUTPATIENT
Start: 2020-10-30 | End: 2020-10-30

## 2020-10-30 RX ORDER — OXYCODONE HYDROCHLORIDE AND ACETAMINOPHEN 5; 325 MG/1; MG/1
1 TABLET ORAL EVERY 4 HOURS PRN
Status: DISCONTINUED | OUTPATIENT
Start: 2020-10-30 | End: 2020-10-31 | Stop reason: HOSPADM

## 2020-10-30 RX ORDER — PROPOFOL 10 MG/ML
INJECTION, EMULSION INTRAVENOUS AS NEEDED
Status: DISCONTINUED | OUTPATIENT
Start: 2020-10-30 | End: 2020-10-30

## 2020-10-30 RX ORDER — HYDROMORPHONE HCL/PF 1 MG/ML
0.5 SYRINGE (ML) INJECTION
Status: DISCONTINUED | OUTPATIENT
Start: 2020-10-30 | End: 2020-10-30 | Stop reason: HOSPADM

## 2020-10-30 RX ORDER — SODIUM CHLORIDE, SODIUM LACTATE, POTASSIUM CHLORIDE, CALCIUM CHLORIDE 600; 310; 30; 20 MG/100ML; MG/100ML; MG/100ML; MG/100ML
100 INJECTION, SOLUTION INTRAVENOUS CONTINUOUS
Status: DISCONTINUED | OUTPATIENT
Start: 2020-10-30 | End: 2020-10-31 | Stop reason: HOSPADM

## 2020-10-30 RX ORDER — SODIUM CHLORIDE, SODIUM LACTATE, POTASSIUM CHLORIDE, CALCIUM CHLORIDE 600; 310; 30; 20 MG/100ML; MG/100ML; MG/100ML; MG/100ML
125 INJECTION, SOLUTION INTRAVENOUS CONTINUOUS
Status: DISCONTINUED | OUTPATIENT
Start: 2020-10-30 | End: 2020-10-30

## 2020-10-30 RX ORDER — DEXTROSE MONOHYDRATE AND SODIUM CHLORIDE 5; .45 G/100ML; G/100ML
50 INJECTION, SOLUTION INTRAVENOUS CONTINUOUS
Status: DISCONTINUED | OUTPATIENT
Start: 2020-10-30 | End: 2020-10-31 | Stop reason: HOSPADM

## 2020-10-30 RX ORDER — MAGNESIUM HYDROXIDE 1200 MG/15ML
LIQUID ORAL AS NEEDED
Status: DISCONTINUED | OUTPATIENT
Start: 2020-10-30 | End: 2020-10-30 | Stop reason: HOSPADM

## 2020-10-30 RX ORDER — GLYCOPYRROLATE 0.2 MG/ML
INJECTION INTRAMUSCULAR; INTRAVENOUS AS NEEDED
Status: DISCONTINUED | OUTPATIENT
Start: 2020-10-30 | End: 2020-10-30

## 2020-10-30 RX ORDER — ONDANSETRON 2 MG/ML
4 INJECTION INTRAMUSCULAR; INTRAVENOUS EVERY 6 HOURS PRN
Status: CANCELLED | OUTPATIENT
Start: 2020-10-30

## 2020-10-30 RX ORDER — ROCURONIUM BROMIDE 10 MG/ML
INJECTION, SOLUTION INTRAVENOUS AS NEEDED
Status: DISCONTINUED | OUTPATIENT
Start: 2020-10-30 | End: 2020-10-30

## 2020-10-30 RX ORDER — HEPARIN SODIUM 5000 [USP'U]/ML
5000 INJECTION, SOLUTION INTRAVENOUS; SUBCUTANEOUS EVERY 8 HOURS SCHEDULED
Status: DISCONTINUED | OUTPATIENT
Start: 2020-10-30 | End: 2020-10-31 | Stop reason: HOSPADM

## 2020-10-30 RX ORDER — MIDAZOLAM HYDROCHLORIDE 2 MG/2ML
INJECTION, SOLUTION INTRAMUSCULAR; INTRAVENOUS AS NEEDED
Status: DISCONTINUED | OUTPATIENT
Start: 2020-10-30 | End: 2020-10-30

## 2020-10-30 RX ORDER — OXYCODONE HYDROCHLORIDE AND ACETAMINOPHEN 5; 325 MG/1; MG/1
1 TABLET ORAL EVERY 4 HOURS PRN
Qty: 20 EACH | Refills: 0 | Status: SHIPPED | OUTPATIENT
Start: 2020-10-30 | End: 2020-10-31 | Stop reason: SDUPTHER

## 2020-10-30 RX ORDER — FENTANYL CITRATE/PF 50 MCG/ML
25 SYRINGE (ML) INJECTION
Status: DISCONTINUED | OUTPATIENT
Start: 2020-10-30 | End: 2020-10-30 | Stop reason: HOSPADM

## 2020-10-30 RX ORDER — ONDANSETRON 2 MG/ML
4 INJECTION INTRAMUSCULAR; INTRAVENOUS ONCE AS NEEDED
Status: DISCONTINUED | OUTPATIENT
Start: 2020-10-30 | End: 2020-10-30 | Stop reason: HOSPADM

## 2020-10-30 RX ORDER — ALBUTEROL SULFATE 2.5 MG/3ML
2.5 SOLUTION RESPIRATORY (INHALATION) EVERY 4 HOURS PRN
Status: DISCONTINUED | OUTPATIENT
Start: 2020-10-30 | End: 2020-10-30 | Stop reason: HOSPADM

## 2020-10-30 RX ORDER — CEFAZOLIN SODIUM 1 G/3ML
INJECTION, POWDER, FOR SOLUTION INTRAMUSCULAR; INTRAVENOUS AS NEEDED
Status: DISCONTINUED | OUTPATIENT
Start: 2020-10-30 | End: 2020-10-30

## 2020-10-30 RX ORDER — DEXTROSE AND SODIUM CHLORIDE 5; .9 G/100ML; G/100ML
125 INJECTION, SOLUTION INTRAVENOUS CONTINUOUS
Status: CANCELLED | OUTPATIENT
Start: 2020-10-30

## 2020-10-30 RX ORDER — SODIUM CHLORIDE 9 MG/ML
INJECTION, SOLUTION INTRAVENOUS CONTINUOUS PRN
Status: DISCONTINUED | OUTPATIENT
Start: 2020-10-30 | End: 2020-10-30

## 2020-10-30 RX ORDER — HEPARIN SODIUM 5000 [USP'U]/ML
5000 INJECTION, SOLUTION INTRAVENOUS; SUBCUTANEOUS EVERY 8 HOURS SCHEDULED
Status: CANCELLED | OUTPATIENT
Start: 2020-10-30

## 2020-10-30 RX ORDER — HYDROMORPHONE HCL/PF 1 MG/ML
SYRINGE (ML) INJECTION AS NEEDED
Status: DISCONTINUED | OUTPATIENT
Start: 2020-10-30 | End: 2020-10-30

## 2020-10-30 RX ORDER — HYDROMORPHONE HCL/PF 1 MG/ML
0.5 SYRINGE (ML) INJECTION
Status: DISCONTINUED | OUTPATIENT
Start: 2020-10-30 | End: 2020-10-31 | Stop reason: HOSPADM

## 2020-10-30 RX ADMIN — CEFAZOLIN 1000 MG: 1 INJECTION, POWDER, FOR SOLUTION INTRAVENOUS at 09:35

## 2020-10-30 RX ADMIN — NEOSTIGMINE METHYLSULFATE 3 MG: 1 INJECTION, SOLUTION INTRAVENOUS at 11:16

## 2020-10-30 RX ADMIN — ONDANSETRON 4 MG: 2 INJECTION INTRAMUSCULAR; INTRAVENOUS at 11:09

## 2020-10-30 RX ADMIN — LIDOCAINE HYDROCHLORIDE 30 MG: 10 INJECTION, SOLUTION EPIDURAL; INFILTRATION; INTRACAUDAL; PERINEURAL at 09:39

## 2020-10-30 RX ADMIN — HEPARIN SODIUM 5000 UNITS: 5000 INJECTION INTRAVENOUS; SUBCUTANEOUS at 21:05

## 2020-10-30 RX ADMIN — GLYCOPYRROLATE 0.1 MG: 0.2 INJECTION, SOLUTION INTRAMUSCULAR; INTRAVENOUS at 10:12

## 2020-10-30 RX ADMIN — METRONIDAZOLE 500 MG: 500 INJECTION, SOLUTION INTRAVENOUS at 09:35

## 2020-10-30 RX ADMIN — GLYCOPYRROLATE 0.4 MG: 0.2 INJECTION, SOLUTION INTRAMUSCULAR; INTRAVENOUS at 11:16

## 2020-10-30 RX ADMIN — FENTANYL CITRATE 100 MCG: 50 INJECTION, SOLUTION INTRAMUSCULAR; INTRAVENOUS at 09:38

## 2020-10-30 RX ADMIN — ROCURONIUM BROMIDE 20 MG: 10 INJECTION, SOLUTION INTRAVENOUS at 10:22

## 2020-10-30 RX ADMIN — DEXTROSE AND SODIUM CHLORIDE 100 ML/HR: 5; .45 INJECTION, SOLUTION INTRAVENOUS at 12:39

## 2020-10-30 RX ADMIN — HYDROMORPHONE HYDROCHLORIDE 0.5 MG: 1 INJECTION, SOLUTION INTRAMUSCULAR; INTRAVENOUS; SUBCUTANEOUS at 11:59

## 2020-10-30 RX ADMIN — ROCURONIUM BROMIDE 50 MG: 10 INJECTION, SOLUTION INTRAVENOUS at 09:39

## 2020-10-30 RX ADMIN — HYDROMORPHONE HYDROCHLORIDE 0.5 MG: 1 INJECTION, SOLUTION INTRAMUSCULAR; INTRAVENOUS; SUBCUTANEOUS at 21:19

## 2020-10-30 RX ADMIN — MIDAZOLAM 2 MG: 1 INJECTION INTRAMUSCULAR; INTRAVENOUS at 09:27

## 2020-10-30 RX ADMIN — PROPOFOL 200 MG: 10 INJECTION, EMULSION INTRAVENOUS at 09:39

## 2020-10-30 RX ADMIN — DEXAMETHASONE SODIUM PHOSPHATE 10 MG: 10 INJECTION, SOLUTION INTRAMUSCULAR; INTRAVENOUS at 09:39

## 2020-10-30 RX ADMIN — ONDANSETRON 4 MG: 2 INJECTION INTRAMUSCULAR; INTRAVENOUS at 21:05

## 2020-10-30 RX ADMIN — HYDROMORPHONE HYDROCHLORIDE 0.5 MG: 1 INJECTION, SOLUTION INTRAMUSCULAR; INTRAVENOUS; SUBCUTANEOUS at 11:33

## 2020-10-30 RX ADMIN — HEPARIN SODIUM 5000 UNITS: 5000 INJECTION INTRAVENOUS; SUBCUTANEOUS at 14:05

## 2020-10-30 RX ADMIN — ROCURONIUM BROMIDE 5 MG: 10 INJECTION, SOLUTION INTRAVENOUS at 11:03

## 2020-10-30 RX ADMIN — ONDANSETRON 4 MG: 2 INJECTION INTRAMUSCULAR; INTRAVENOUS at 14:05

## 2020-10-30 RX ADMIN — Medication 25 MCG: at 11:47

## 2020-10-30 RX ADMIN — Medication 25 MCG: at 12:34

## 2020-10-30 RX ADMIN — HYDROMORPHONE HYDROCHLORIDE 0.5 MG: 1 INJECTION, SOLUTION INTRAMUSCULAR; INTRAVENOUS; SUBCUTANEOUS at 12:10

## 2020-10-30 RX ADMIN — OXYCODONE HYDROCHLORIDE AND ACETAMINOPHEN 1 TABLET: 5; 325 TABLET ORAL at 18:10

## 2020-10-30 RX ADMIN — SODIUM CHLORIDE, SODIUM LACTATE, POTASSIUM CHLORIDE, AND CALCIUM CHLORIDE 125 ML/HR: .6; .31; .03; .02 INJECTION, SOLUTION INTRAVENOUS at 08:13

## 2020-10-30 RX ADMIN — DEXMEDETOMIDINE 0.2 MCG/KG/HR: 100 INJECTION, SOLUTION, CONCENTRATE INTRAVENOUS at 09:46

## 2020-10-30 RX ADMIN — SODIUM CHLORIDE: 0.9 INJECTION, SOLUTION INTRAVENOUS at 09:41

## 2020-10-30 RX ADMIN — HYDROMORPHONE HYDROCHLORIDE 0.5 MG: 1 INJECTION, SOLUTION INTRAMUSCULAR; INTRAVENOUS; SUBCUTANEOUS at 11:23

## 2020-10-30 RX ADMIN — LIDOCAINE HYDROCHLORIDE 0.5 ML: 10 INJECTION, SOLUTION EPIDURAL; INFILTRATION; INTRACAUDAL; PERINEURAL at 08:13

## 2020-10-30 RX ADMIN — DEXTROSE AND SODIUM CHLORIDE 100 ML/HR: 5; .45 INJECTION, SOLUTION INTRAVENOUS at 21:06

## 2020-10-30 RX ADMIN — Medication 25 MCG: at 11:54

## 2020-10-31 VITALS
HEIGHT: 66 IN | BODY MASS INDEX: 25.23 KG/M2 | SYSTOLIC BLOOD PRESSURE: 151 MMHG | RESPIRATION RATE: 20 BRPM | TEMPERATURE: 98.9 F | DIASTOLIC BLOOD PRESSURE: 86 MMHG | WEIGHT: 157 LBS | HEART RATE: 65 BPM | OXYGEN SATURATION: 95 %

## 2020-10-31 RX ORDER — OXYCODONE HYDROCHLORIDE AND ACETAMINOPHEN 5; 325 MG/1; MG/1
1 TABLET ORAL EVERY 4 HOURS PRN
Qty: 20 TABLET | Refills: 0 | Status: SHIPPED | OUTPATIENT
Start: 2020-10-31 | End: 2020-11-05

## 2020-10-31 RX ADMIN — HEPARIN SODIUM 5000 UNITS: 5000 INJECTION INTRAVENOUS; SUBCUTANEOUS at 05:11

## 2020-10-31 RX ADMIN — DEXTROSE AND SODIUM CHLORIDE 100 ML/HR: 5; .45 INJECTION, SOLUTION INTRAVENOUS at 07:51

## 2020-10-31 RX ADMIN — OXYCODONE HYDROCHLORIDE AND ACETAMINOPHEN 1 TABLET: 5; 325 TABLET ORAL at 07:46

## 2020-10-31 RX ADMIN — OXYCODONE HYDROCHLORIDE AND ACETAMINOPHEN 1 TABLET: 5; 325 TABLET ORAL at 02:01

## 2020-10-31 RX ADMIN — OXYCODONE HYDROCHLORIDE AND ACETAMINOPHEN 1 TABLET: 5; 325 TABLET ORAL at 12:25

## 2020-11-02 ENCOUNTER — TRANSITIONAL CARE MANAGEMENT (OUTPATIENT)
Dept: FAMILY MEDICINE CLINIC | Facility: CLINIC | Age: 65
End: 2020-11-02

## 2020-11-30 PROBLEM — D37.3 APPENDICEAL TUMOR: Status: RESOLVED | Noted: 2020-10-21 | Resolved: 2020-11-30

## 2021-01-13 DIAGNOSIS — D37.3 LOW GRADE MUCINOUS NEOPLASM OF APPENDIX: ICD-10-CM

## 2021-01-13 DIAGNOSIS — M81.0 OSTEOPOROSIS OF LUMBAR SPINE: ICD-10-CM

## 2021-01-13 DIAGNOSIS — M51.36 DDD (DEGENERATIVE DISC DISEASE), LUMBAR: ICD-10-CM

## 2021-01-13 DIAGNOSIS — E66.3 OVERWEIGHT WITH BODY MASS INDEX (BMI) OF 25 TO 25.9 IN ADULT: ICD-10-CM

## 2021-01-13 DIAGNOSIS — E55.9 VITAMIN D DEFICIENCY: Primary | ICD-10-CM

## 2021-01-13 RX ORDER — TRAMADOL HYDROCHLORIDE 50 MG/1
50 TABLET ORAL 2 TIMES DAILY PRN
Qty: 30 TABLET | Refills: 0 | Status: SHIPPED | OUTPATIENT
Start: 2021-01-13 | End: 2021-03-26 | Stop reason: SDUPTHER

## 2021-01-13 NOTE — TELEPHONE ENCOUNTER
Medication: ultram 50 mg   Last refilled: 9/17/19  Last Office Visit: 12/17/19  Next Office Visit: N/A   Pharmacy:

## 2021-01-14 NOTE — TELEPHONE ENCOUNTER
I can send in a small supply but she is due for a visit - has not been seen in over a year  Please schedule medicare wellness visit and I will order labs to be done a week prior

## 2021-01-14 NOTE — TELEPHONE ENCOUNTER
Patient scheduled her medicare wellness for March but is not having her bloodwork completed because she is healthy and feels she does not need to have it done  She had labs drawn in October and you can review them but she is not having labs re done    Last time she had received a high bill and does not want to fight with insurance to have them covered again

## 2021-03-21 ENCOUNTER — TELEPHONE (OUTPATIENT)
Dept: FAMILY MEDICINE CLINIC | Facility: CLINIC | Age: 66
End: 2021-03-21

## 2021-03-21 NOTE — TELEPHONE ENCOUNTER
Please remind pt to get fasting blood work prior to appointment this week  (please note she has refused blood work in past)

## 2021-03-22 NOTE — PROGRESS NOTES
Assessment/Plan:     1  Encounter for Medicare annual wellness exam  See below     2  Low grade mucinous neoplasm of appendix  Follows with CR    3  Migraine without aura and without status migrainosus, not intractable  More rare  Good control with as needed Fiorcet  Last prescription long ago  Controlled Substance Review    [place] PDMP or [place]  reviewed: No red flags were identified; safe to proceed with prescription  Cari Melendez Butalbital-APAP-Caffeine (Fioricet) -40 MG CAPS; Take 1 tablet by mouth as needed (migraine)  Dispense: 30 capsule; Refill: 2    4  Generalized convulsive epilepsy ([facility] )  Last grand mal 20 yrs ago  Most recent small seizure in oct after passing out with pain  She is not concerned and does not want to follow up with neuro  Will call if any chagnes  5  Fibromyalgia  Flaring since surgery, upper back and neck  6  Other ulcerative colitis with complication ([facility] )  Follows with CR     7  Vitamin D deficiency  Declines labs but taking over the counter vitamin d   - Cholecalciferol (Vitamin D3) 125 MCG (5000 UT) CAPS; Take 1 capsule (5,000 Units total) by mouth daily Over the counter    8  Encounter for screening mammogram for breast cancer  - Mammo screening bilateral w 3d & cad; Future    9  Asymptomatic postmenopausal state            11  Osteoporosis of lumbar spine    - DXA bone density spine hip and pelvis; Future    12  Sacroiliitis ([facility] )    13  Long term use opiate   Doing well on as needed Tramadol  Using as directed  Helps pain and function  Refill 3 month supply today  14  DDD (degenerative disc disease), lumbar  See above   - traMADol (ULTRAM) 50 mg tablet; Take 1 tablet (50 mg total) by mouth 2 (two) times a day as needed for moderate pain  Dispense: 90 tablet; Refill: 0    15  Kyphoscoliosis  See above  decliens PT - feels it makes pain worse  Seeing chiropractor     16  Cerumen impaction right   Removed with irrigation per MA  Tolerated well       Well adult exam  · Continue healthy diet   ·         Encourage exercise 4 times a week or more for minimum 30 minutes  ·         Continue to see dentist, wear seatbelt  ·         Health maintenance reviewed - ordered mammo and dexa but she may not get these  She declines all vaccine  Declines blood work  States maybe before her next visit  Reviewed age appropriate health maintenance screenings and immunizations that are due, risks and benefits of these  Health Maintenance   Topic Date Due    MAMMOGRAM  Never done    DXA SCAN  Never done    COVID-19 Vaccine (1) Never done   [facility] Annual Wellness Visit (AWV)  09/17/2020    BMI: Followup Plan  09/17/2020    Influenza Vaccine (1) 06/30/2021 (Originally 9/1/2020)    Pneumococcal Vaccine: 65+ Years (1 of 1 - PPSV23) 03/26/2022 (Originally 9/20/2019)    DTaP,Tdap,and Td Vaccines (1 - Tdap) 03/26/2022 (Originally 9/20/1976)    Fall Risk  03/26/2022    Depression Screening PHQ  03/26/2022    BMI: Adult  03/26/2022    Colonoscopy Surveillance  10/02/2023    Colorectal Cancer Screening  10/02/2030    Hepatitis C Screening  Completed    HIB Vaccine  Aged Out    Hepatitis B Vaccine  Aged Out    IPV Vaccine  Aged Out    Hepatitis A Vaccine  Aged Out    Meningococcal ACWY Vaccine  Aged Out    HPV Vaccine  Aged Out     Return in about 6 months (around 9/26/2021) for CC, pain, migraines   Subjective:    CATY Swartz is a 76 y o  female who presents today for a physical      Chief Complaint   Patient presents with    Medicare Wellness Visit     PATIENT DECLINED ALL VACCINES      PHQ-9 Depression Screening    PHQ-9:   Frequency of the following problems over the past two weeks:      Little interest or pleasure in doing things: 0 - not at all  Feeling down, depressed, or hopeless: 0 - not at all  PHQ-2 Score: 0        ---Above per clinical staff & reviewed  ---  Patient here today for a physical:    Diet: healthy   Exercise:   Active - cares for grandson, walks dog, greenhouse   Dental visits:  yes  Seatbelt: yes     Concerns today:   had a stroke in January - affected his speech   Pain in hands, arthritis acting up   Takes edge off an allows her to function   Before meds on a bad day 8-10/10   One tramadol and advil - second one later in the day   5-6/10 after meds  Using tramadol with activity  No side effects noted  Has not bad - can be triggered by smells or sun   Left ear clogged  Last seizure 20 yrs ago   In Oct after surgery with fainting had small seizure   No follow up with neuro now            The following portions of the patient's history were reviewed and updated as appropriate: allergies, current medications, past family history, past medical history, past social history, past surgical history and problem list      Current Outpatient Medications   Medication Sig Dispense Refill    CALCIUM-MAGNESIUM PO Take 3 tablets by mouth daily       LUTEIN PO Take 1 capsule by mouth daily       Omega-3 Fatty Acids (fish oil) 1,000 mg Take 1,000 mg by mouth daily      traMADol (ULTRAM) 50 mg tablet Take 1 tablet (50 mg total) by mouth 2 (two) times a day as needed for moderate pain 90 tablet 0    Butalbital-APAP-Caffeine (Fioricet) -40 MG CAPS Take 1 tablet by mouth as needed (migraine) 30 capsule 2    Cholecalciferol (Vitamin D3) 125 MCG (5000 UT) CAPS Take 1 capsule (5,000 Units total) by mouth daily Over the counter       No current facility-administered medications for this visit           Objective:      /86   Pulse 60   Temp 98 4 °F (36 9 °C)   Resp 12   Ht 5' 4 45" (1 637 m)   Wt 70 9 kg (156 lb 3 2 oz)   SpO2 98%   BMI 26 44 kg/m²   BP Readings from Last 3 Encounters:   03/26/21 136/86   10/31/20 151/86   10/02/20 129/61     Wt Readings from Last 3 Encounters:   03/26/21 70 9 kg (156 lb 3 2 oz)   11/30/20 71 7 kg (158 lb)   10/30/20 71 2 kg (157 lb)       Review of Systems  ROS:  all others negative - no chest pain, SOB, normal urine and bowels  no GERD  sleeping well  mood good  Physical Exam   Constitutional: she appears well-developed and well-nourished  HENT: Head: Normocephalic  Right Ear: External ear normal  Cerumen impaction  Left Ear: External ear normal    Tympanic membrane normal    Nose: Nose normal  No mucosal edema, No rhinorrhea  Right sinus exhibits no maxillary sinus tenderness  Left sinus exhibits no maxillary sinus tenderness  Mouth/Throat: Oropharynx is clear and moist    Eyes: Normal conjunctiva  No erythema  No discharge  Neck: No pain on exam  Neck supple  Cardiovascular: Normal rate, regular rhythm and normal heart sounds  Pulmonary/Chest: Effort normal and breath sounds normal  No wheezes  No rales  No rhonchi  Abdominal: Soft  Bowel sounds are normal  There is no tenderness  Musculoskeletal: she exhibits no edema  Lymphadenopathy: she has no cervical adenopathy  Neurological: she  is alert and oriented to person, place, and time  Skin: Skin is warm and dry  No rashes  Psychiatric: she  has a normal mood and affect  her behavior is normal  Thought content normal    Vitals reviewed  BMI Counseling: Body mass index is 26 44 kg/m²  The BMI is above normal  Nutrition recommendations include decreasing portion sizes  Exercise recommendations include exercising 3-5 times per week

## 2021-03-22 NOTE — TELEPHONE ENCOUNTER
Patient declined to complete labs informed she is completely health and had labs completed in October  Patient will see you as scheduled on Friday

## 2021-03-26 ENCOUNTER — OFFICE VISIT (OUTPATIENT)
Dept: FAMILY MEDICINE CLINIC | Facility: CLINIC | Age: 66
End: 2021-03-26
Payer: MEDICARE

## 2021-03-26 VITALS
HEIGHT: 64 IN | OXYGEN SATURATION: 98 % | SYSTOLIC BLOOD PRESSURE: 136 MMHG | WEIGHT: 156.2 LBS | TEMPERATURE: 98.4 F | RESPIRATION RATE: 12 BRPM | HEART RATE: 60 BPM | BODY MASS INDEX: 26.67 KG/M2 | DIASTOLIC BLOOD PRESSURE: 86 MMHG

## 2021-03-26 DIAGNOSIS — M51.36 DDD (DEGENERATIVE DISC DISEASE), LUMBAR: ICD-10-CM

## 2021-03-26 DIAGNOSIS — G40.309 GENERALIZED CONVULSIVE EPILEPSY (HCC): ICD-10-CM

## 2021-03-26 DIAGNOSIS — D37.3 LOW GRADE MUCINOUS NEOPLASM OF APPENDIX: ICD-10-CM

## 2021-03-26 DIAGNOSIS — M79.7 FIBROMYALGIA: ICD-10-CM

## 2021-03-26 DIAGNOSIS — Z78.0 ASYMPTOMATIC POSTMENOPAUSAL STATE: ICD-10-CM

## 2021-03-26 DIAGNOSIS — M41.9 KYPHOSCOLIOSIS: ICD-10-CM

## 2021-03-26 DIAGNOSIS — Z79.891 LONG TERM (CURRENT) USE OF OPIATE ANALGESIC: ICD-10-CM

## 2021-03-26 DIAGNOSIS — G43.009 MIGRAINE WITHOUT AURA AND WITHOUT STATUS MIGRAINOSUS, NOT INTRACTABLE: ICD-10-CM

## 2021-03-26 DIAGNOSIS — Z00.00 ENCOUNTER FOR MEDICARE ANNUAL WELLNESS EXAM: Primary | ICD-10-CM

## 2021-03-26 DIAGNOSIS — E55.9 VITAMIN D DEFICIENCY: ICD-10-CM

## 2021-03-26 DIAGNOSIS — K51.819 OTHER ULCERATIVE COLITIS WITH COMPLICATION (HCC): ICD-10-CM

## 2021-03-26 DIAGNOSIS — M81.0 OSTEOPOROSIS OF LUMBAR SPINE: ICD-10-CM

## 2021-03-26 DIAGNOSIS — H61.21 HEARING LOSS DUE TO CERUMEN IMPACTION, RIGHT: ICD-10-CM

## 2021-03-26 DIAGNOSIS — M46.1 SACROILIITIS (HCC): ICD-10-CM

## 2021-03-26 DIAGNOSIS — Z12.31 ENCOUNTER FOR SCREENING MAMMOGRAM FOR BREAST CANCER: ICD-10-CM

## 2021-03-26 PROBLEM — F11.20 UNCOMPLICATED OPIOID DEPENDENCE (HCC): Status: RESOLVED | Noted: 2021-03-26 | Resolved: 2021-03-26

## 2021-03-26 PROBLEM — F11.20 UNCOMPLICATED OPIOID DEPENDENCE (HCC): Status: ACTIVE | Noted: 2021-03-26

## 2021-03-26 PROCEDURE — 99214 OFFICE O/P EST MOD 30 MIN: CPT | Performed by: FAMILY MEDICINE

## 2021-03-26 PROCEDURE — G0438 PPPS, INITIAL VISIT: HCPCS | Performed by: FAMILY MEDICINE

## 2021-03-26 PROCEDURE — 1123F ACP DISCUSS/DSCN MKR DOCD: CPT | Performed by: FAMILY MEDICINE

## 2021-03-26 RX ORDER — TRAMADOL HYDROCHLORIDE 50 MG/1
50 TABLET ORAL 2 TIMES DAILY PRN
Qty: 90 TABLET | Refills: 0 | Status: SHIPPED | OUTPATIENT
Start: 2021-03-26 | End: 2022-05-13 | Stop reason: SDUPTHER

## 2021-03-26 RX ORDER — MAG HYDROX/ALUMINUM HYD/SIMETH 400-400-40
5000 SUSPENSION, ORAL (FINAL DOSE FORM) ORAL DAILY
Start: 2021-03-26

## 2021-03-26 RX ORDER — BUTALBITAL, ACETAMINOPHEN AND CAFFEINE 300; 40; 50 MG/1; MG/1; MG/1
1 CAPSULE ORAL AS NEEDED
Qty: 30 CAPSULE | Refills: 2 | Status: SHIPPED | OUTPATIENT
Start: 2021-03-26 | End: 2021-06-24

## 2021-03-26 NOTE — PATIENT INSTRUCTIONS
Medicare Preventive Visit Patient Instructions  Thank you for completing your Welcome to Medicare Visit or Medicare Annual Wellness Visit today  Your next wellness visit will be due in one year (3/27/2022)  The screening/preventive services that you may require over the next 5-10 years are detailed below  Some tests may not apply to you based off risk factors and/or age  Screening tests ordered at today's visit but not completed yet may show as past due  Also, please note that scanned in results may not display below  Preventive Screenings:  Service Recommendations Previous Testing/Comments   Colorectal Cancer Screening  * Colonoscopy    * Fecal Occult Blood Test (FOBT)/Fecal Immunochemical Test (FIT)  * Fecal DNA/Cologuard Test  * Flexible Sigmoidoscopy Age: 54-65 years old   Colonoscopy: every 10 years (may be performed more frequently if at higher risk)  OR  FOBT/FIT: every 1 year  OR  Cologuard: every 3 years  OR  Sigmoidoscopy: every 5 years  Screening may be recommended earlier than age 48 if at higher risk for colorectal cancer  Also, an individualized decision between you and your healthcare provider will decide whether screening between the ages of 74-80 would be appropriate  Colonoscopy: 10/02/2020  FOBT/FIT: Not on file  Cologuard: Not on file  Sigmoidoscopy: Not on file    Screening Current     Breast Cancer Screening Age: 36 years old  Frequency: every 1-2 years  Not required if history of left and right mastectomy Mammogram: Not on file        Cervical Cancer Screening Between the ages of 21-29, pap smear recommended once every 3 years  Between the ages of 33-67, can perform pap smear with HPV co-testing every 5 years     Recommendations may differ for women with a history of total hysterectomy, cervical cancer, or abnormal pap smears in past  Pap Smear: 12/17/2019    Screening Not Indicated   Hepatitis C Screening Once for adults born between 1945 and 1965  More frequently in patients at high risk for Hepatitis C Hep C Antibody: 09/30/2019    Screening Current   Diabetes Screening 1-2 times per year if you're at risk for diabetes or have pre-diabetes Fasting glucose: 106 mg/dL   A1C: 5 7    Screening Current   Cholesterol Screening Once every 5 years if you don't have a lipid disorder  May order more often based on risk factors  Lipid panel: Not on file          Other Preventive Screenings Covered by Medicare:  1  Abdominal Aortic Aneurysm (AAA) Screening: covered once if your at risk  You're considered to be at risk if you have a family history of AAA  2  Lung Cancer Screening: covers low dose CT scan once per year if you meet all of the following conditions: (1) Age 50-69; (2) No signs or symptoms of lung cancer; (3) Current smoker or have quit smoking within the last 15 years; (4) You have a tobacco smoking history of at least 30 pack years (packs per day multiplied by number of years you smoked); (5) You get a written order from a healthcare provider  3  Glaucoma Screening: covered annually if you're considered high risk: (1) You have diabetes OR (2) Family history of glaucoma OR (3)  aged 48 and older OR (3)  American aged 72 and older  3  Osteoporosis Screening: covered every 2 years if you meet one of the following conditions: (1) You're estrogen deficient and at risk for osteoporosis based off medical history and other findings; (2) Have a vertebral abnormality; (3) On glucocorticoid therapy for more than 3 months; (4) Have primary hyperparathyroidism; (5) On osteoporosis medications and need to assess response to drug therapy  · Last bone density test (DXA Scan): Not on file  5  HIV Screening: covered annually if you're between the age of 12-76  Also covered annually if you are younger than 13 and older than 72 with risk factors for HIV infection  For pregnant patients, it is covered up to 3 times per pregnancy      Immunizations:  Immunization Recommendations Influenza Vaccine Annual influenza vaccination during flu season is recommended for all persons aged >= 6 months who do not have contraindications   Pneumococcal Vaccine (Prevnar and Pneumovax)  * Prevnar = PCV13  * Pneumovax = PPSV23   Adults 25-60 years old: 1-3 doses may be recommended based on certain risk factors  Adults 72 years old: Prevnar (PCV13) vaccine recommended followed by Pneumovax (PPSV23) vaccine  If already received PPSV23 since turning 65, then PCV13 recommended at least one year after PPSV23 dose  Hepatitis B Vaccine 3 dose series if at intermediate or high risk (ex: diabetes, end stage renal disease, liver disease)   Tetanus (Td) Vaccine - COST NOT COVERED BY MEDICARE PART B Following completion of primary series, a booster dose should be given every 10 years to maintain immunity against tetanus  Td may also be given as tetanus wound prophylaxis  Tdap Vaccine - COST NOT COVERED BY MEDICARE PART B Recommended at least once for all adults  For pregnant patients, recommended with each pregnancy  Shingles Vaccine (Shingrix) - COST NOT COVERED BY MEDICARE PART B  2 shot series recommended in those aged 48 and above     Health Maintenance Due:      Topic Date Due    MAMMOGRAM  Never done    DXA SCAN  Never done    Colonoscopy Surveillance  10/02/2023    Colorectal Cancer Screening  10/02/2030    Hepatitis C Screening  Completed     Immunizations Due:      Topic Date Due    COVID-19 Vaccine (1) Never done     Advance Directives   What are advance directives? Advance directives are legal documents that state your wishes and plans for medical care  These plans are made ahead of time in case you lose your ability to make decisions for yourself  Advance directives can apply to any medical decision, such as the treatments you want, and if you want to donate organs  What are the types of advance directives?   There are many types of advance directives, and each state has rules about how to use them  You may choose a combination of any of the following:  · Living will: This is a written record of the treatment you want  You can also choose which treatments you do not want, which to limit, and which to stop at a certain time  This includes surgery, medicine, IV fluid, and tube feedings  · Durable power of  for healthcare [facility]): This is a written record that states who you want to make healthcare choices for you when you are unable to make them for yourself  This person, called a proxy, is usually a family member or a friend  You may choose more than 1 proxy  · Do not resuscitate (DNR) order:  A DNR order is used in case your heart stops beating or you stop breathing  It is a request not to have certain forms of treatment, such as CPR  A DNR order may be included in other types of advance directives  · Medical directive: This covers the care that you want if you are in a coma, near death, or unable to make decisions for yourself  You can list the treatments you want for each condition  Treatment may include pain medicine, surgery, blood transfusions, dialysis, IV or tube feedings, and a ventilator (breathing machine)  · Values history: This document has questions about your views, beliefs, and how you feel and think about life  This information can help others choose the care that you would choose  Why are advance directives important? An advance directive helps you control your care  Although spoken wishes may be used, it is better to have your wishes written down  Spoken wishes can be misunderstood, or not followed  Treatments may be given even if you do not want them  An advance directive may make it easier for your family to make difficult choices about your care  Weight Management   Why it is important to manage your weight:  Being overweight increases your risk of health conditions such as heart disease, high blood pressure, type 2 diabetes, and certain types of cancer   It can also increase your risk for osteoarthritis, sleep apnea, and other respiratory problems  Aim for a slow, steady weight loss  Even a small amount of weight loss can lower your risk of health problems  How to lose weight safely:  A safe and healthy way to lose weight is to eat fewer calories and get regular exercise  You can lose up about 1 pound a week by decreasing the number of calories you eat by 500 calories each day  Healthy meal plan for weight management:  A healthy meal plan includes a variety of foods, contains fewer calories, and helps you stay healthy  A healthy meal plan includes the following:  · Eat whole-grain foods more often  A healthy meal plan should contain fiber  Fiber is the part of grains, fruits, and vegetables that is not broken down by your body  Whole-grain foods are healthy and provide extra fiber in your diet  Some examples of whole-grain foods are whole-wheat breads and pastas, oatmeal, brown rice, and bulgur  · Eat a variety of vegetables every day  Include dark, leafy greens such as spinach, kale, marc greens, and mustard greens  Eat yellow and orange vegetables such as carrots, sweet potatoes, and winter squash  · Eat a variety of fruits every day  Choose fresh or canned fruit (canned in its own juice or light syrup) instead of juice  Fruit juice has very little or no fiber  · Eat low-fat dairy foods  Drink fat-free (skim) milk or 1% milk  Eat fat-free yogurt and low-fat cottage cheese  Try low-fat cheeses such as mozzarella and other reduced-fat cheeses  · Choose meat and other protein foods that are low in fat  Choose beans or other legumes such as split peas or lentils  Choose fish, skinless poultry (chicken or turkey), or lean cuts of red meat (beef or pork)  Before you cook meat or poultry, cut off any visible fat  · Use less fat and oil  Try baking foods instead of frying them   Add less fat, such as margarine, sour cream, regular salad dressing and mayonnaise to foods  Eat fewer high-fat foods  Some examples of high-fat foods include french fries, doughnuts, ice cream, and cakes  · Eat fewer sweets  Limit foods and drinks that are high in sugar  This includes candy, cookies, regular soda, and sweetened drinks  Exercise:  Exercise at least 30 minutes per day on most days of the week  Some examples of exercise include walking, biking, dancing, and swimming  You can also fit in more physical activity by taking the stairs instead of the elevator or parking farther away from stores  Ask your healthcare provider about the best exercise plan for you  Alcohol Use and Your Health    Drinking too much can harm your health  Excessive alcohol use leads to about 88,000 death in the [place] each year, and shortens the life of those who diet by almost 30 years  Further, excessive drinking cost the economy $249 billion in 2010  Most excessive drinkers are not alcohol dependent  Excessive alcohol use has immediate effects that increase the risk of many harmful health conditions  These are most often the result of binge drinking  Over time, excessive alcohol use can lead to the development of chronic diseases and other series health problems  What is considered a "drink"? Excessive alcohol use includes:  · Binge Drinking: For women, 4 or more drinks consumed on one occasion  For men, 5 or more drinks consumed on one occasion  · Heavy Drinking: For women, 8 or more drinks per week  For men, 15 or more drinks per week  · Any alcohol used by pregnant women  · Any alcohol used by those under the age of 21 years    If you choose to drink, do so in moderation:  · Do not drink at all if you are under the age of 24, or if you are or may be pregnant, or have health problems that could be made worse by drinking    · For women, up to 1 drink per day  · For men, up to 2 drinks a day    No one should begin drinking or drink more frequently based on potential health benefits    Short-Term Health Risks:  · Injuries: motor vehicle crashes, falls, drownings, burns  · Violence: homicide, suicide, sexual assault, intimate partner violence  · Alcohol poisoning  · Reproductive health: risky sexual behaviors, unintended prengnacy, sexually transmitted diseases, miscarriage, stillbirth, fetal alcohol syndrome    Long-Term Health Risks:  · Chronic diseases: high blood pressure, heart disease, stroke, liver disease, digestive problems  · Cancers: breast, mouth and throat, liver, colon  · Learning and memory problems: dementia, poor school performance  · Mental health: depression, anxiety, insomnia  · Social problems: lost productivity, family problems, unemployment  · Alcohol dependence    For support and more information:  · Substance Abuse and [address] [address]  Web Address: https://[employer]/    · Alcoholics Anonymous        Web Address: http://[employer]/    https://www cdc gov/alcohol/fact-sheets/alcohol-use htm  Narcotic (Opioid) Safety    Use narcotics safely:  · Take prescribed narcotics exactly as directed  · Do not give narcotics to others or take narcotics that belong to someone else  · Do not mix narcotics without medicines or alcohol  · Do not drive or operate heavy machinery after you take the narcotic  · Monitor for side effects and notify your healthcare provider if you experienced side effects such as nausea, sleepiness, itching, or trouble thinking clearly  Manage constipation:    Constipation is the most common side effect of narcotic medicine  Constipation is when you have hard, dry bowel movements, or you go longer than usual between bowel movements  Tell your healthcare provider about all changes in your bowel movements while you are taking narcotics  He or she may recommend laxative medicine to help you have a bowel movement   He or she may also change the kind of narcotic you are taking, or change when you take it  The following are more ways you can prevent or relieve constipation:    · Drink liquids as directed  You may need to drink extra liquids to help soften and move your bowels  Ask how much liquid to drink each day and which liquids are best for you  · Eat high-fiber foods  This may help decrease constipation by adding bulk to your bowel movements  High-fiber foods include fruits, vegetables, whole-grain breads and cereals, and beans  Your healthcare provider or dietitian can help you create a high-fiber meal plan  Your provider may also recommend a fiber supplement if you cannot get enough fiber from food  · Exercise regularly  Regular physical activity can help stimulate your intestines  Walking is a good exercise to prevent or relieve constipation  Ask which exercises are best for you  · Schedule a time each day to have a bowel movement  This may help train your body to have regular bowel movements  Bend forward while you are on the toilet to help move the bowel movement out  Sit on the toilet for at least 10 minutes, even if you do not have a bowel movement  Store narcotics safely:   · Store narcotics where others cannot easily get them  Keep them in a locked cabinet or secure area  Do not  keep them in a purse or other bag you carry with you  A person may be looking for something else and find the narcotics  · Make sure narcotics are stored out of the reach of children  A child can easily overdose on narcotics  Narcotics may look like candy to a small child  The best way to dispose of narcotics: The laws vary by country and area  In the [place], the best way is to return the narcotics through a take-back program  This program is offered by the [employer])  The following are options for using the program:  · Take the narcotics to a MANNY collection site  The site is often a law enforcement center   Call your local law enforcement center for scheduled take-back days in your area  You will be given information on where to go if the collection site is in a different location  · Take the narcotics to an approved pharmacy or hospital   A pharmacy or hospital may be set up as a collection site  You will need to ask if it is a MANNY collection site if you were not directed there  A pharmacy or doctor's office may not be able to take back narcotics unless it is a MANNY site  · Use a mail-back system  This means you are given containers to put the narcotics into  You will then mail them in the containers  · Use a take-back drop box  This is a place to leave the narcotics at any time  People and animals will not be able to get into the box  Your local law enforcement agency can tell you where to find a drop box in your area  Other ways to manage pain:   · Ask your healthcare provider about non-narcotic medicines to control pain  Nonprescription medicines include NSAIDs (such as ibuprofen) and acetaminophen  Prescription medicines include muscle relaxers, antidepressants, and steroids  · Pain may be managed without any medicines  Some ways to relieve pain include massage, aromatherapy, or meditation  Physical or occupational therapy may also help  For more information:   · Drug Enforcement Administration  [facility] [address]  Phone: 8- [phone] - [phone]  Web Address: [facility]/drug_disposal/    · [address]  and Drug Administration  [facility] , [address] [place] [address]  Phone: 6- 220 - [phone]  Web Address: http://[employer]/     © Copyright [facility] 2018 Information is for End User's use only and may not be sold, redistributed or otherwise used for commercial purposes   All illustrations and images included in CareNotes® are the copyrighted property of [employer]  or [facility] [employer]

## 2021-03-26 NOTE — PROGRESS NOTES
Assessment and Plan:     Problem List Items Addressed This Visit        Unprioritized    Osteoporosis of lumbar spine    Relevant Orders    DXA bone density spine hip and pelvis    DDD (degenerative disc disease), lumbar    Relevant Medications    traMADol (ULTRAM) 50 mg tablet    Fibromyalgia    Generalized convulsive epilepsy ([facility] )    Kyphoscoliosis    Migraine without aura    Relevant Medications    traMADol (ULTRAM) 50 mg tablet    Butalbital-APAP-Caffeine (Fioricet) -40 MG CAPS    Vitamin D deficiency    Relevant Medications    Cholecalciferol (Vitamin D3) 125 MCG (5000 UT) CAPS    Ulcerative colitis ([facility] [address] )    Low grade mucinous neoplasm of appendix    Sacroiliitis (HCC)    Uncomplicated opioid dependence ([facility] [address] )      Other Visit Diagnoses     Encounter for Medicare annual wellness exam    -  Primary    Encounter for screening mammogram for breast cancer        Relevant Orders    Mammo screening bilateral w 3d & cad    Asymptomatic postmenopausal state            BMI Counseling: Body mass index is 26 44 kg/m²  The BMI is above normal  Nutrition recommendations include decreasing portion sizes  Exercise recommendations include exercising 3-5 times per week  Medicare annual wellness exam   Health Maintenance Due   Topic Date Due    MAMMOGRAM  Never done    DXA SCAN  Never done    COVID-19 Vaccine (1) Never done   [facility] Annual Wellness Visit (AWV)  09/17/2020    BMI: Followup Plan  09/17/2020      Reviewed appropriate diabetic & cardiovascular screening and prevention  Reviewed age appropriate cancer screenings and pros and cons  Reviewed bloodwork & immunizations that are appropriate for this patient - declines both  Advanced care planning was reviewed  Paperwork at home to complete  After reviews of risks, benefits of above ordered appropriate tests that pt agrees to       Preventive health issues were discussed with patient, and age appropriate screening tests were ordered as noted in patient's After Visit Summary  Personalized health advice and appropriate referrals for health education or preventive services given if needed, as noted in patient's After Visit Summary       History of Present Illness:     Patient presents for Medicare Annual Wellness visit    Patient Care Team:  Bianca Dunne DO as PCP - General (Family Medicine)     Problem List:     Patient Active Problem List   Diagnosis    Intervertebral disc disorder with radiculopathy of lumbar region    Osteoporosis of lumbar spine    DDD (degenerative disc disease), lumbar    Fibromyalgia    Generalized convulsive epilepsy ([address] )    Kyphoscoliosis    Migraine without aura    Varicose veins    Vitamin D deficiency    Ulcerative colitis ([address] )    Screening for malignant neoplasm of colon    Proctosigmoiditis    Low grade mucinous neoplasm of appendix    Sacroiliitis ([address] )    Uncomplicated opioid dependence ([address] )      Past Medical and Surgical History:     Past Medical History:   Diagnosis Date    Atypical ductal hyperplasia of right breast     Feb 2010 ([facility])     Decreased libido     Disorder of bone and cartilage     Resolved 5/29/2015     Dysplasia of cervix     s/p cone biopsy - 2002    Epilepsy ([facility] [address] )     has not had any episodes in 20 years    Fibromyalgia     1988 no specialists, no meds    Fibromyalgia, primary     Hypothyroidism, unspecified     Resolved 5/29/2015     Menopause     Sept 2008     Osteoarthrosis of hand     Left    Osteoporosis     Resolved 5/29/2015     Seizure grand mal ([address] )     Seizures ([address] )     last seizure over twenty years ago    Ulcerative colitis ([facility] [address] )     last c-scope 8 yrs ago wnl, treated with suppositories in the past- not active x 8-10 yrs    Uterine fibroid     Vitamin D deficiency, unspecified     Resolved 5/29/2015      Past Surgical History:   Procedure Laterality Date    BREAST SURGERY Right 03/2010    [address] , left lumpectomies [facility] [place] CERVICAL CONE BIOPSY  2002    COLONOSCOPY      [place] LAP,SURG,COLECTOMY, PARTIAL, W/ANAST N/A 10/30/2020    Procedure: RESECTION COLON RIGHT LAPAROSCOPIC/ Cecoappendectomy;  Surgeon: Kraley Snyder MD;  [facility];  Service: Colorectal    UTERINE FIBROID SURGERY        Family History:     Family History   Problem Relation Age of Onset    Hypertension Father     Heart disease Father     Heart attack Father     Fibromyalgia Mother     Osteoporosis Mother     Colon cancer Maternal Grandfather     Colon cancer Cousin       Social History:        Social History     Socioeconomic History    Marital status: /Civil Union     Spouse name: None    Number of children: 3    Years of education: None    Highest education level: None   Occupational History    Occupation: Retired    Social Needs    Financial resource strain: None    Food insecurity     Worry: None     Inability: None    Transportation needs     Medical: None     Non-medical: None   Tobacco Use    Smoking status: Former Smoker     Packs/day: 0 25     Years: 4 00     Pack years: 1 00     Start date: 3/26/1961     Quit date: 1970     Years since quittin 5    Smokeless tobacco: Never Used   Substance and Sexual Activity    Alcohol use: Yes     Frequency: 2-3 times a week     Drinks per session: 1 or 2     Binge frequency: Never     Comment: 1-2 glasses per night - As per Care Everywhere     Drug use: Yes     Frequency: 7 0 times per week     Types: Marijuana     Comment: vapes     Sexual activity: Not Currently   Lifestyle    Physical activity     Days per week: None     Minutes per session: None    Stress: None   Relationships    Social connections     Talks on phone: None     Gets together: None     Attends [employer] service: None     Active member of club or organization: None     Attends meetings of clubs or organizations: None     Relationship status: None    Intimate partner violence     Fear of current or ex partner: None     Emotionally abused: None     Physically abused: None     Forced sexual activity: None   Other Topics Concern    None   Social History Narrative    None      Medications and Allergies:     Current Outpatient Medications   Medication Sig Dispense Refill    CALCIUM-MAGNESIUM PO Take 3 tablets by mouth daily       LUTEIN PO Take 1 capsule by mouth daily       Omega-3 Fatty Acids (fish oil) 1,000 mg Take 1,000 mg by mouth daily      traMADol (ULTRAM) 50 mg tablet Take 1 tablet (50 mg total) by mouth 2 (two) times a day as needed for moderate pain 90 tablet 0    Butalbital-APAP-Caffeine (Fioricet) -40 MG CAPS Take 1 tablet by mouth as needed (migraine) 30 capsule 2    Cholecalciferol (Vitamin D3) 125 MCG (5000 UT) CAPS Take 1 capsule (5,000 Units total) by mouth daily Over the counter       No current facility-administered medications for this visit  No Known Allergies   Immunizations: There is no immunization history on file for this patient  Health Maintenance:         Topic Date Due    MAMMOGRAM  Never done    DXA SCAN  Never done    Colonoscopy Surveillance  10/02/2023    Colorectal Cancer Screening  10/02/2030    Hepatitis C Screening  Completed         Topic Date Due    COVID-19 Vaccine (1) Never done      Medicare Health Risk Assessment:     /86   Pulse 60   Temp 98 4 °F (36 9 °C)   Resp 12   Ht 5' 4 45" (1 637 m)   Wt 70 9 kg (156 lb 3 2 oz)   SpO2 98%   BMI 26 44 kg/m²      Jeff Rosenbaum is here for her Subsequent Wellness visit  Health Risk Assessment:   Patient rates overall health as very good  Patient feels that their physical health rating is same  Patient is satisfied with their life  Eyesight was rated as much worse  Hearing was rated as same  Patient feels that their emotional and mental health rating is same  Patients states they are never, rarely angry  Patient states they are sometimes unusually tired/fatigued   Pain experienced in the last 7 days has been a lot  Patient's pain rating has been 7/10  Patient states that she has experienced no weight loss or gain in last 6 months  Depression Screening:   PHQ-2 Score: 0      Fall Risk Screening: In the past year, patient has experienced: no history of falling in past year      Urinary Incontinence Screening:   Patient has not leaked urine accidently in the last six months  Home Safety:  Patient does not have trouble with stairs inside or outside of their home  Patient has working smoke alarms and has working carbon monoxide detector  Home safety hazards include: none  Nutrition:   Current diet is Regular  Medications:   Patient is currently taking over-the-counter supplements  OTC medications include: see medication list  Patient is able to manage medications  Activities of Daily Living (ADLs)/Instrumental Activities of Daily Living (IADLs):   Walk and transfer into and out of bed and chair?: Yes  Dress and groom yourself?: Yes    Bathe or shower yourself?: Yes    Feed yourself? Yes  Do your laundry/housekeeping?: Yes  Manage your money, pay your bills and track your expenses?: Yes  Make your own meals?: Yes    Do your own shopping?: Yes    Previous Hospitalizations:   Any hospitalizations or ED visits within the last 12 months?: Yes    How many hospitalizations have you had in the last year?: 1-2    Advance Care Planning:   Living will: No    Advanced directive: No    Advanced directive counseling given: Yes      Comments: Paperwork at home to complete       Cognitive Screening:   Provider or family/friend/caregiver concerned regarding cognition?: No    PREVENTIVE SCREENINGS        Diabetes Screening:     General: Screening Current      Colorectal Cancer Screening:     General: Screening Current      Cervical Cancer Screening:    General: Screening Not Indicated      Osteoporosis Screening:    General: Screening Not Indicated and History Osteoporosis      Lung Cancer Screening: General: Screening Not Indicated      Hepatitis C Screening:    General: Screening Current    Screening, Brief Intervention, and Referral to Treatment (SBIRT)    Screening  Typical number of drinks in a day: 2  Typical number of drinks in a week: 10  Interpretation: Low risk drinking behavior  AUDIT-C Screenin) How often did you have a drink containing alcohol in the past year? 2 to 3 times a week  2) How many drinks did you have on a typical day when you were drinking in the past year? 1 to 2  3) How often did you have 6 or more drinks on one occasion in the past year? never    AUDIT-C Score: 3  Interpretation: Score 3-12 (female): POSITIVE screen for alcohol misuse    AUDIT Screenin) How often during the last year have you found that you were not able to stop drinking once you had started? 0 - never  5) How often during the last year have you failed to do what was normally expected from you because of drinking? 0 - never  6) How often during the last year have you needed a first drink in the morning to get yourself going after a heavy drinking session?  0 - never  7) How often during the last year have you had a feeling of guilt or remorse after drinking? 0 - never  8) How often during the last year have you been unable to remember what happened the night before because you had been drinking? 0 - never  9) Have you or someone else been injured as a result of your drinking? 0 - no  10) Has a relative or friend or a doctor or another health worker been concerned about your drinking or suggested you cut down? 0 - no    AUDIT Score: 3  Interpretation: Low risk alcohol consumption    Single Item Drug Screening:  How often have you used an illegal drug (including marijuana) or a prescription medication for non-medical reasons in the past year? never    Single Item Drug Screen Score: 0  Interpretation: Negative screen for possible drug use disorder    Review of Current Opioid Use  Opioid Risk Tool (ORT) Score: 0  Opioid Risk Tool (ORT) Interpretation: Score 0-3: Low risk for opioid misuse      Mary Rodríguez DO

## 2021-05-10 ENCOUNTER — HOSPITAL ENCOUNTER (OUTPATIENT)
Dept: CT IMAGING | Facility: HOSPITAL | Age: 66
Discharge: HOME | End: 2021-05-10
Attending: COLON & RECTAL SURGERY
Payer: MEDICARE

## 2021-05-10 DIAGNOSIS — D37.3 LOW GRADE MUCINOUS NEOPLASM OF APPENDIX: ICD-10-CM

## 2021-05-10 PROCEDURE — G1004 CDSM NDSC: HCPCS

## 2021-05-10 PROCEDURE — 74177 CT ABD & PELVIS W/CONTRAST: CPT

## 2021-05-10 PROCEDURE — 71260 CT THORAX DX C+: CPT

## 2021-05-10 RX ADMIN — IOHEXOL 100 ML: 350 INJECTION, SOLUTION INTRAVENOUS at 10:32

## 2021-12-04 ENCOUNTER — NURSE TRIAGE (OUTPATIENT)
Dept: OTHER | Facility: OTHER | Age: 66
End: 2021-12-04

## 2021-12-04 DIAGNOSIS — L50.9 HIVES: Primary | ICD-10-CM

## 2021-12-04 RX ORDER — METHYLPREDNISOLONE 4 MG/1
TABLET ORAL
Qty: 1 EACH | Refills: 0 | Status: SHIPPED | OUTPATIENT
Start: 2021-12-04 | End: 2022-06-23

## 2022-05-04 ENCOUNTER — ANESTHESIA EVENT (OUTPATIENT)
Dept: GASTROENTEROLOGY | Facility: HOSPITAL | Age: 67
End: 2022-05-04

## 2022-05-04 ENCOUNTER — TELEPHONE (OUTPATIENT)
Dept: FAMILY MEDICINE CLINIC | Facility: CLINIC | Age: 67
End: 2022-05-04

## 2022-05-04 ENCOUNTER — ANESTHESIA (OUTPATIENT)
Dept: GASTROENTEROLOGY | Facility: HOSPITAL | Age: 67
End: 2022-05-04

## 2022-05-04 ENCOUNTER — HOSPITAL ENCOUNTER (OUTPATIENT)
Dept: GASTROENTEROLOGY | Facility: HOSPITAL | Age: 67
Setting detail: OUTPATIENT SURGERY
Discharge: HOME | End: 2022-05-04
Attending: COLON & RECTAL SURGERY | Admitting: COLON & RECTAL SURGERY
Payer: MEDICARE

## 2022-05-04 VITALS
OXYGEN SATURATION: 100 % | DIASTOLIC BLOOD PRESSURE: 67 MMHG | TEMPERATURE: 96.9 F | BODY MASS INDEX: 24.11 KG/M2 | HEIGHT: 66 IN | RESPIRATION RATE: 16 BRPM | HEART RATE: 53 BPM | SYSTOLIC BLOOD PRESSURE: 129 MMHG | WEIGHT: 150 LBS

## 2022-05-04 DIAGNOSIS — K51.819 OTHER ULCERATIVE COLITIS WITH COMPLICATION (HCC): ICD-10-CM

## 2022-05-04 PROCEDURE — 45385 COLONOSCOPY W/LESION REMOVAL: CPT | Performed by: COLON & RECTAL SURGERY

## 2022-05-04 PROCEDURE — 88305 TISSUE EXAM BY PATHOLOGIST: CPT | Performed by: PATHOLOGY

## 2022-05-04 PROCEDURE — 99213 OFFICE O/P EST LOW 20 MIN: CPT | Performed by: COLON & RECTAL SURGERY

## 2022-05-04 RX ORDER — PROPOFOL 10 MG/ML
INJECTION, EMULSION INTRAVENOUS AS NEEDED
Status: DISCONTINUED | OUTPATIENT
Start: 2022-05-04 | End: 2022-05-04

## 2022-05-04 RX ORDER — LIDOCAINE HYDROCHLORIDE 10 MG/ML
INJECTION, SOLUTION EPIDURAL; INFILTRATION; INTRACAUDAL; PERINEURAL AS NEEDED
Status: DISCONTINUED | OUTPATIENT
Start: 2022-05-04 | End: 2022-05-04

## 2022-05-04 RX ORDER — SODIUM CHLORIDE 9 MG/ML
INJECTION, SOLUTION INTRAVENOUS CONTINUOUS PRN
Status: DISCONTINUED | OUTPATIENT
Start: 2022-05-04 | End: 2022-05-04

## 2022-05-04 RX ADMIN — LIDOCAINE HYDROCHLORIDE 50 MG: 10 INJECTION, SOLUTION EPIDURAL; INFILTRATION; INTRACAUDAL; PERINEURAL at 09:22

## 2022-05-04 RX ADMIN — PROPOFOL 100 MG: 10 INJECTION, EMULSION INTRAVENOUS at 09:22

## 2022-05-04 RX ADMIN — PROPOFOL 50 MG: 10 INJECTION, EMULSION INTRAVENOUS at 09:35

## 2022-05-04 RX ADMIN — PROPOFOL 20 MG: 10 INJECTION, EMULSION INTRAVENOUS at 09:32

## 2022-05-04 RX ADMIN — PROPOFOL 20 MG: 10 INJECTION, EMULSION INTRAVENOUS at 09:38

## 2022-05-04 RX ADMIN — PROPOFOL 50 MG: 10 INJECTION, EMULSION INTRAVENOUS at 09:23

## 2022-05-04 RX ADMIN — PROPOFOL 20 MG: 10 INJECTION, EMULSION INTRAVENOUS at 09:33

## 2022-05-04 RX ADMIN — PROPOFOL 50 MG: 10 INJECTION, EMULSION INTRAVENOUS at 09:24

## 2022-05-04 RX ADMIN — PROPOFOL 20 MG: 10 INJECTION, EMULSION INTRAVENOUS at 09:27

## 2022-05-04 RX ADMIN — SODIUM CHLORIDE: 9 INJECTION, SOLUTION INTRAVENOUS at 09:19

## 2022-05-04 RX ADMIN — PROPOFOL 20 MG: 10 INJECTION, EMULSION INTRAVENOUS at 09:31

## 2022-05-04 NOTE — H&P
History and Physical   Colon and Rectal Surgery   Danay Barajas 78 y o  female MRN: [id number]  Unit/Bed#:  Encounter: [id number]  05/04/22   9:14 AM      CC:  History of LAMN  Resected last year  History of lung nodules  History of Present Illness   HPI:  Danay Barajas is a 78 y o  female for surveillance    Historical Information   Past Medical History:   Diagnosis Date    Atypical ductal hyperplasia of right breast     Feb 2010 ([facility]uevara/[facility])     Decreased libido     Disorder of bone and cartilage     Resolved 5/29/2015     Dysplasia of cervix     s/p cone biopsy - 2002    Epilepsy ([facility] [address] )     has not had any episodes in 20 years    Fibromyalgia     1988 no specialists, no meds    Fibromyalgia, primary     Hypothyroidism, unspecified     Resolved 5/29/2015     Menopause     Sept 2008     Osteoarthrosis of hand     Left    Osteoporosis     Resolved 5/29/2015     Seizure grand mal ([facility] )     Seizures ([facility] [address] )     last seizure over twenty years ago    Ulcerative colitis ([facility] [address] )     last c-scope 8 yrs ago wnl, treated with suppositories in the past- not active x 8-10 yrs    Uterine fibroid     Vitamin D deficiency, unspecified     Resolved 5/29/2015      Past Surgical History:   Procedure Laterality Date    BREAST SURGERY Right 03/2010    [address] , left lumpectomies [place] [facility] [address]  2002    COLONOSCOPY      [place] LAP,SURG,COLECTOMY, PARTIAL, W/ANAST N/A 10/30/2020    Procedure: RESECTION COLON RIGHT LAPAROSCOPIC/ Cecoappendectomy;  Surgeon: Karley Snyder MD;  [facility];  Service: Colorectal    UTERINE FIBROID SURGERY         Meds/Allergies     (Not in a hospital admission)        Current Outpatient Medications:     CALCIUM-MAGNESIUM PO, Take 3 tablets by mouth daily , Disp: , Rfl:     Cholecalciferol (Vitamin D3) 125 MCG (5000 UT) CAPS, Take 1 capsule (5,000 Units total) by mouth daily Over the counter, Disp: , Rfl:     Omega-3 Fatty Acids (fish oil) 1,000 mg, Take 1,000 mg by mouth daily, Disp: , Rfl:     LUTEIN PO, Take 1 capsule by mouth daily , Disp: , Rfl:     methylPREDNISolone 4 MG tablet therapy pack, Use as directed on package, Disp: 1 each, Rfl: 0    traMADol (ULTRAM) 50 mg tablet, Take 1 tablet (50 mg total) by mouth 2 (two) times a day as needed for moderate pain, Disp: 90 tablet, Rfl: 0    No Known Allergies      Social History   Social History     Substance and Sexual Activity   Alcohol Use Yes    Comment: 1-2 glasses per night - As per Care Everywhere      Social History     Substance and Sexual Activity   Drug Use Yes    Frequency: 7 0 times per week    Types: Marijuana    Comment: vapes      Social History     Tobacco Use   Smoking Status Former Smoker    Packs/day: 0 25    Years: 4 00    Pack years: 1 00    Start date: 3/26/1961   [place] Quit date: 1970    Years since quittin 6   Smokeless Tobacco Never Used         Family History:   Family History   Problem Relation Age of Onset    Hypertension Father     Heart disease Father     Heart attack Father     Fibromyalgia Mother     Osteoporosis Mother     Colon cancer Maternal Grandfather     Colon cancer Cousin      Review of Systems - General ROS: negative  Respiratory ROS: negative  Cardiovascular ROS: negative     Objective     Current Vitals:   Blood Pressure: 133/71 (22 08)  Pulse: (!) 51 (22)  Temperature: (!) 97 2 °F (36 2 °C) (22)  Temp Source: Tympanic (22)  Respirations: 18 (22)  Height: 5' 6" (167 6 cm) (22 3374)  Weight - Scale: 68 kg (150 lb) (22)  SpO2: 97 % (22)  No intake or output data in the 24 hours ending 22    Physical Exam:  General:  Well nourished, no distress  Neuro: Alert and oriented  Eyes:Sclera anicteric, conjunctiva pink  Pulm: Clear to auscultation bilaterally  No respiratory Distress  CV:  Regular rate and rhythm  No murmurs    Abdomen:  Soft, flat, non-tender, without masses or hepatosplenomegaly  Lab Results:       ASSESSMENT:  Mir Wick is a 78 y o  female for surveillance  PLAN:  Colonoscopy  Risks , including, but not limited to, bleeding, perforation, missed lesions, and potential need for surgery, were reviewed  Alternatives to colonoscopy were discussed  Repeat chest CT to be arranged    Guerita Ventura MD

## 2022-05-04 NOTE — ANESTHESIA POSTPROCEDURE EVALUATION
Post-Op Assessment Note    CV Status:  Stable  Pain Score: 0    Pain management: adequate     Mental Status:  Alert   Hydration Status:  Euvolemic   PONV Controlled:  None   Airway Patency:  Patent      Post Op Vitals Reviewed: Yes      Staff: CRNA         No complications documented      /56 (05/04/22 0946)    Temp (!) 96 9 °F (36 1 °C) (05/04/22 0946)    Pulse (!) 52 (05/04/22 0946)   Resp 18 (05/04/22 0946)    SpO2 97 % (05/04/22 0946)

## 2022-05-04 NOTE — ANESTHESIA PREPROCEDURE EVALUATION
Procedure:  COLONOSCOPY    Relevant Problems   ANESTHESIA (within normal limits)      CARDIO   (+) Migraine without aura   (-) HTN (hypertension)   (-) Hyperlipidemia      ENDO   (-) Diabetes mellitus, type 2 (HCC)   (-) Hyperthyroidism   (-) Hypothyroidism      GI/HEPATIC   (-) Gastroesophageal reflux disease      /RENAL (within normal limits)      HEMATOLOGY (within normal limits)      MUSCULOSKELETAL   (+) DDD (degenerative disc disease), lumbar   (+) Fibromyalgia   (+) Kyphoscoliosis   (+) Sacroiliitis (HCC)      NEURO/PSYCH   (+) Fibromyalgia   (+) Generalized convulsive epilepsy (HCC)   (+) Migraine without aura      PULMONARY   (-) Asthma   (-) Sleep apnea        Physical Exam    Airway    Mallampati score: III  TM Distance: >3 FB  Neck ROM: full     Dental   No notable dental hx     Cardiovascular  Rhythm: regular, Rate: normal, No murmur,     Pulmonary  Breath sounds clear to auscultation,     Other Findings        Anesthesia Plan  ASA Score- 3     Anesthesia Type- IV sedation with anesthesia with ASA Monitors  Additional Monitors:   Airway Plan:           Plan Factors-Exercise tolerance (METS): >4 METS  Chart reviewed  EKG reviewed  Existing labs reviewed  Patient summary reviewed  Patient is not a current smoker  Induction- intravenous  Postoperative Plan-     Informed Consent- Anesthetic plan and risks discussed with patient  I personally reviewed this patient with the CRNA  Discussed and agreed on the Anesthesia Plan with the TORSTEN Wylie

## 2022-05-05 NOTE — TELEPHONE ENCOUNTER
Called patient and she does not want to schedule at this time, she will call back when she is ready to schedule

## 2022-05-13 DIAGNOSIS — M51.36 DDD (DEGENERATIVE DISC DISEASE), LUMBAR: ICD-10-CM

## 2022-05-16 NOTE — TELEPHONE ENCOUNTER
Medication refill requested: traMADol (ULTRAM) 50 mg tablet   Last office visit: 03/26/2021  Next office visit: None   Last refilled: 03/26/2021  Labs: No  Ordering Provider: [id number] Montoya [facility] (select pharmacy send RX to):      [facility]  [address]  Phone: [phone] Fax: [phone] Clothing

## 2022-05-17 RX ORDER — TRAMADOL HYDROCHLORIDE 50 MG/1
50 TABLET ORAL 2 TIMES DAILY PRN
Qty: 30 TABLET | Refills: 0 | Status: SHIPPED | OUTPATIENT
Start: 2022-05-17 | End: 2022-06-23 | Stop reason: SDUPTHER

## 2022-05-17 NOTE — TELEPHONE ENCOUNTER
Placed call to patient, scheduled patient for 6/23/22 for controlled substance follow up  Patient currently has 12 tablets left of the tramadol and will need a refill prior to her appointment  Patient declines to make an AWV at this time

## 2022-05-17 NOTE — TELEPHONE ENCOUNTER
Controlled substance  Per new [address] policy needs OV every 3 months  Schedule opioid follow up visit  Also due for medicare wellness visit  Let me know if she will run out before the appointment

## 2022-06-20 ENCOUNTER — RA CDI HCC (OUTPATIENT)
Dept: OTHER | Facility: HOSPITAL | Age: 67
End: 2022-06-20

## 2022-06-20 NOTE — PROGRESS NOTES
[address]  coding opportunities       Chart reviewed, no opportunity found: CHART REVIEWED, NO OPPORTUNITY FOUND        Patients Insurance     Medicare Insurance: Medicare

## 2022-06-23 ENCOUNTER — OFFICE VISIT (OUTPATIENT)
Dept: FAMILY MEDICINE CLINIC | Facility: CLINIC | Age: 67
End: 2022-06-23
Payer: MEDICARE

## 2022-06-23 VITALS
BODY MASS INDEX: 24.49 KG/M2 | OXYGEN SATURATION: 100 % | DIASTOLIC BLOOD PRESSURE: 88 MMHG | TEMPERATURE: 97.5 F | HEART RATE: 65 BPM | RESPIRATION RATE: 18 BRPM | HEIGHT: 66 IN | WEIGHT: 152.4 LBS | SYSTOLIC BLOOD PRESSURE: 138 MMHG

## 2022-06-23 DIAGNOSIS — Z79.899 HIGH RISK MEDICATION USE: ICD-10-CM

## 2022-06-23 DIAGNOSIS — M51.36 DDD (DEGENERATIVE DISC DISEASE), LUMBAR: Primary | ICD-10-CM

## 2022-06-23 DIAGNOSIS — G40.309 GENERALIZED CONVULSIVE EPILEPSY (HCC): ICD-10-CM

## 2022-06-23 DIAGNOSIS — Z12.31 SCREENING MAMMOGRAM, ENCOUNTER FOR: ICD-10-CM

## 2022-06-23 DIAGNOSIS — M46.1 SACROILIITIS (HCC): ICD-10-CM

## 2022-06-23 DIAGNOSIS — G89.29 OTHER CHRONIC PAIN: ICD-10-CM

## 2022-06-23 PROCEDURE — 99214 OFFICE O/P EST MOD 30 MIN: CPT | Performed by: FAMILY MEDICINE

## 2022-06-23 RX ORDER — TRAMADOL HYDROCHLORIDE 50 MG/1
50 TABLET ORAL 2 TIMES DAILY PRN
Qty: 30 TABLET | Refills: 1 | Status: SHIPPED | OUTPATIENT
Start: 2022-06-23

## 2022-06-23 NOTE — PATIENT INSTRUCTIONS

## 2022-06-23 NOTE — PROGRESS NOTES
Assessment/Plan     Problem List Items Addressed This Visit        Unprioritized    DDD (degenerative disc disease), lumbar - Primary    Relevant Medications    traMADol (ULTRAM) 50 mg tablet    Other Relevant Orders    [facility] All Prescribed Meds    Amphetamines, Methamphetamines    Butalbital    Phenobarbital    Secobarbital    Temazepam    Alprazolam    Clonazepam    Diazepam    Lorazepam    Oxazepam    Gabapentin    Pregabalin    Cocaine    Heroin    Buprenorphine    Levorphanol    Meperidine    Naltrexone    Fentanyl    Methadone    Oxycodone    Oxymorphone    Tapentadol    Tramadol    Codeine, Hydrocodone, Hydropmorphone, Morphine    Bath Salts    Kratom    Spice    Methylphenidate    Phentermine    Validity Oxidant    Validity Creatinine    Validity pH    Validity Specific    Generalized convulsive epilepsy (HCC)    Sacroiliitis ([facility] [address] )      Other Visit Diagnoses     High risk medication use        Relevant Orders    [facility] All Prescribed Meds    Amphetamines, Methamphetamines    Butalbital    Phenobarbital    Secobarbital    Temazepam    Alprazolam    Clonazepam    Diazepam    Lorazepam    Oxazepam    Gabapentin    Pregabalin    Cocaine    Heroin    Buprenorphine    Levorphanol    Meperidine    Naltrexone    Fentanyl    Methadone    Oxycodone    Oxymorphone    Tapentadol    Tramadol    Codeine, Hydrocodone, Hydropmorphone, Morphine    Bath Salts    Kratom    Spice    Methylphenidate    Phentermine    Validity Oxidant    Validity Creatinine    Validity pH    Validity Specific    Other chronic pain        Relevant Orders    [facility] All Prescribed Meds    Amphetamines, Methamphetamines    Butalbital    Phenobarbital    Secobarbital    Temazepam    Alprazolam    Clonazepam    Diazepam    Lorazepam    Oxazepam    Gabapentin    Pregabalin    Cocaine    Heroin    Buprenorphine    Levorphanol    Meperidine    Naltrexone    Fentanyl    Methadone    Oxycodone    Oxymorphone    Tapentadol    Tramadol Codeine, Hydrocodone, Hydropmorphone, Morphine    Bath Salts    Kratom    Spice    Methylphenidate    Phentermine    Validity Oxidant    Validity Creatinine    Validity pH    Validity Specific    Screening mammogram, encounter for        Relevant Orders    Mammo screening bilateral w 3d & cad       agrees to mammogram   Declines vaccines, MWV, DEXA   Treatment Plan: Continue Tramadol as needed       Treatment Goals: Pain relief for gardening, long car rides    Opiate risks  There are risks associated with opioid medications, including dependence, addiction and tolerance  The patient understands and agrees to use these medications only as prescribed  Potential side effects of the medications include, but are not limited to, constipation, drowsiness, addiction, impaired judgment and risk of fatal overdose if not taken as prescribed  The patient was warned against driving while taking sedation medications  Sharing medications is a felony  At this point in time, the patient is showing no signs of addiction, abuse, diversion or suicidal ideation  Opioid agreement  Pain management agreement was reviewed with patient and signed/updated during visit      Drug screen  Drug screen collected during today's visit      PD[place] review  [place] or [place]  reviewed  No red flags were identified; safe to proceed with prescription      Last Ultram Tues   taking supplements (hyurlenic acid, collegen - glucosamine with MSM)   Jovani Gaitan to sleep         Depression Screening and Follow-up Plan: Patient was screened for depression during today's encounter  They screened negative with a PHQ-2 score of 0  Subjective     Opioid Management:   Type of visit: Initial    Likes gardening - uses it for this   Right hip and hand pain   Tried 2 advil and it does not work   1 advil 1 tramadol   Without pains 8/10, after 3-4/10   Dry mouth - no constipation, no dizziness   Can work longer with medicine   Very rare to use more than one in a day  Current pain description: Pain is fine, doing well  Functional status: good    Goals of care: To be able to garden, to be able to ride in the car over an hour    [address]  Patient receives support from their: other (Friends )    Screening Tools/Assessments:    PHQ-2/9:  PHQ-2 score: 0    Opioid Risk Tool (ORT):  Current ORT Score: 12 (high risk for opiate abuse)  Brief Pain Inventory (BPI):  1) Throughout our lives, most of us have had pain from time to time (such as minor headaches, sprains, and toothaches)  Have you had pain other than these everyday kinds of pain today? Yes  2) Where is your pain located? right lower back  3) Rate your pain at its worst in the last 24 hours: 8  4) Rate your pain at its least in the last 24 hours: 3  5) Rate your average level of pain: 4  6) Rate your pain right now: 2  7) What treatments or medications are you receiving for your pain? tramadol  8) In the past 24 hours, how much relief have pain treatments or medication provided? 30%  9) During the past 24 hours, pain has interfered with your:     A) General activity: 6     B) Mood: 2     C) Walking ability: 6     D) Normal work (work outside the home & housework): 4     E) Relations with other people: 0     F) Sleep: 2     G) Enjoyment of life: 4    Drug Screen:      Drug screen comments: Obtained today    Opioid agreement:  Active Opioid agreement on file?: Yes    Opioid agreement signed date: 6/23/2022  Opioid agreement expiration date: 9/17/2020    Naloxone:  Currently prescribed Naloxone (Narcan): No    Reason not prescribing Naloxone: patient declines    High risk medications  High risk meds taken in last 72 hours: Tramadol   marijuana    Other treatments tried/failed:   Naproxen (OTC), ibuprofen (OTC), heat, cold compresses, TENS unit, topical creams (OTC), herbal remedies, chiropractic manipulation and rest    Fish oil, topicals, MSM  FM - PT hurt muscles     Prior imaging:  Hip imaging with cortisone shot [facility] years ago   Saw pain mgmt [facility] then  [facility]  (better results)     Prior referrals:  Orthopedics, Physical therapy and Pain management    HPI  Pain Medications             traMADol (ULTRAM) 50 mg tablet Take 1 tablet (50 mg total) by mouth 2 (two) times a day as needed for moderate pain         Outpatient Morphine Milligram Equivalents Per Day     6/23/22 and after 10 MME/Day    Order Name Dose Route Frequency Maximum MME/Day     traMADol (ULTRAM) 50 mg tablet 50 mg Oral 2 times daily PRN 10 MME/Day    Total Potential Morphine Milligram Equivalents Per Day 10 MME/Day    Calculation Information        traMADol (ULTRAM) 50 mg tablet    traMADol 50 mg Tabs: single dose of 50 mg * 2 doses per day * morphine equivalence factor of 0 1 = 10 MME/Day                         PDMP Review       Value Time User    PDMP Reviewed  Yes 6/23/2022 10:32 AM Marizol Jeffries,          Review of Systems   Constitutional: Negative for activity change, appetite change, chills, fever and unexpected weight change  HENT: Negative for congestion, ear pain and sore throat  Eyes: Negative for pain  Respiratory: Negative for chest tightness and shortness of breath  Cardiovascular: Negative for chest pain and leg swelling  Gastrointestinal: Negative for abdominal pain, constipation, diarrhea, nausea and vomiting  Genitourinary: Negative for difficulty urinating and dysuria  Musculoskeletal: Negative for joint swelling and myalgias  Skin: Negative for rash  Neurological: Negative for dizziness  Hematological: Negative for adenopathy  Psychiatric/Behavioral: Negative for dysphoric mood and sleep disturbance  Objective     /88   Pulse 65   Temp 97 5 °F (36 4 °C)   Resp 18   Ht 5' 6" (1 676 m)   Wt 69 1 kg (152 lb 6 4 oz)   SpO2 100%   BMI 24 60 kg/m²     Physical Exam  Constitutional:       Appearance: She is well-developed  HENT:      Head: Normocephalic and atraumatic        Right Ear: Tympanic membrane and ear canal normal       Left Ear: Tympanic membrane and ear canal normal       Nose: Nose normal    Eyes:      General: Lids are normal       Conjunctiva/sclera: Conjunctivae normal       Pupils: Pupils are equal, round, and reactive to light  Cardiovascular:      Rate and Rhythm: Normal rate and regular rhythm  Heart sounds: Normal heart sounds  Pulmonary:      Effort: Pulmonary effort is normal       Breath sounds: Normal breath sounds  Abdominal:      General: Bowel sounds are normal       Palpations: Abdomen is soft  Musculoskeletal:         General: Normal range of motion  Cervical back: Normal range of motion and neck supple  Lymphadenopathy:      Cervical: No cervical adenopathy  Skin:     General: Skin is warm and dry  Neurological:      Mental Status: She is alert and oriented to person, place, and time  Psychiatric:         Behavior: Behavior normal          Thought Content:  Thought content normal          Judgment: Judgment normal          Maciel Velazquez DO

## 2022-06-28 LAB
6MAM UR QL CFM: NEGATIVE NG/ML
7AMINOCLONAZEPAM UR QL CFM: NEGATIVE NG/ML
A-OH ALPRAZ UR QL CFM: NEGATIVE NG/ML
ACCEPTABLE CREAT UR QL: NORMAL MG/DL
ACCEPTIBLE SP GR UR QL: NORMAL
AMPHET UR QL CFM: NEGATIVE NG/ML
BUPRENORPHINE UR QL CFM: NEGATIVE NG/ML
BUTALBITAL UR QL CFM: NEGATIVE NG/ML
BZE UR QL CFM: NEGATIVE NG/ML
CODEINE UR QL CFM: NEGATIVE NG/ML
EDDP UR QL CFM: NEGATIVE NG/ML
EUTYLONE UR QL: NEGATIVE NG/ML
FENTANYL UR QL CFM: NEGATIVE NG/ML
GLIADIN IGG SER IA-ACNC: NEGATIVE NG/ML
HYDROCODONE UR QL CFM: NEGATIVE NG/ML
HYDROMORPHONE UR QL CFM: NEGATIVE NG/ML
LORAZEPAM UR QL CFM: NEGATIVE NG/ML
ME-PHENIDATE UR QL CFM: NEGATIVE NG/ML
MEPERIDINE UR QL CFM: NEGATIVE NG/ML
METHADONE UR QL CFM: NEGATIVE NG/ML
METHAMPHET UR QL CFM: NEGATIVE NG/ML
MORPHINE UR QL CFM: NEGATIVE NG/ML
NALTREXONE UR QL CFM: NEGATIVE NG/ML
NITRITE UR QL: NORMAL UG/ML
NORBUPRENORPHINE UR QL CFM: NEGATIVE NG/ML
NORDIAZEPAM UR QL CFM: NEGATIVE NG/ML
NORFENTANYL UR QL CFM: NEGATIVE NG/ML
NORHYDROCODONE UR QL CFM: NEGATIVE NG/ML
NORMEPERIDINE UR QL CFM: NEGATIVE NG/ML
NOROXYCODONE UR QL CFM: NEGATIVE NG/ML
OXAZEPAM UR QL CFM: NEGATIVE NG/ML
OXYCODONE UR QL CFM: NEGATIVE NG/ML
OXYMORPHONE UR QL CFM: NEGATIVE NG/ML
OXYMORPHONE UR QL CFM: NEGATIVE NG/ML
PHENOBARB UR QL CFM: NEGATIVE NG/ML
RESULT ALL_PRESCRIBED MEDS AND SPECIAL INSTRUCTIONS: NORMAL
SECOBARBITAL UR QL CFM: NEGATIVE NG/ML
SL AMB 3-METHYL-FENTANYL QUANTIFICATION: NORMAL NG/ML
SL AMB 4-ANPP QUANTIFICATION: NORMAL NG/ML
SL AMB 4-FIBF QUANTIFICATION: NORMAL NG/ML
SL AMB 5F-ADB-M7 METABOLITE QUANTIFICATION: NEGATIVE NG/ML
SL AMB 7-OH-MITRAGYNINE (KRATOM ALKALOID) QUANTIFICATION: NEGATIVE NG/ML
SL AMB AB-FUBINACA-M3 METABOLITE QUANTIFICATION: NEGATIVE NG/ML
SL AMB ACETYL FENTANYL QUANTIFICATION: NORMAL NG/ML
SL AMB ACETYL NORFENTANYL QUANTIFICATION: NORMAL NG/ML
SL AMB ACRYL FENTANYL QUANTIFICATION: NORMAL NG/ML
SL AMB BUTRYL FENTANYL QUANTIFICATION: NORMAL NG/ML
SL AMB CARFENTANIL QUANTIFICATION: NORMAL NG/ML
SL AMB CYCLOPROPYL FENTANYL QUANTIFICATION: NORMAL NG/ML
SL AMB DEXTRORPHAN (DEXTROMETHORPHAN METABOLITE) QUANT: NEGATIVE NG/ML
SL AMB FURANYL FENTANYL QUANTIFICATION: NORMAL NG/ML
SL AMB GABAPENTIN QUANTIFICATION: NEGATIVE
SL AMB JWH018 METABOLITE QUANTIFICATION: NEGATIVE NG/ML
SL AMB JWH073 METABOLITE QUANTIFICATION: NEGATIVE NG/ML
SL AMB MDMB-FUBINACA-M1 METABOLITE QUANTIFICATION: NEGATIVE NG/ML
SL AMB METHOXYACETYL FENTANYL QUANTIFICATION: NORMAL NG/ML
SL AMB METHYLONE QUANTIFICATION: NEGATIVE NG/ML
SL AMB N-DESMETHYL U-47700 QUANTIFICATION: NORMAL NG/ML
SL AMB N-DESMETHYL-TRAMADOL QUANTIFICATION: ABNORMAL NG/ML
SL AMB PHENTERMINE QUANTIFICATION: NEGATIVE NG/ML
SL AMB PREGABALIN QUANTIFICATION: NEGATIVE
SL AMB RCS4 METABOLITE QUANTIFICATION: NEGATIVE NG/ML
SL AMB RITALINIC ACID QUANTIFICATION: NEGATIVE NG/ML
SL AMB U-47700 QUANTIFICATION: NORMAL NG/ML
SMOOTH MUSCLE AB TITR SER IF: NEGATIVE NG/ML
SPECIMEN DRAWN SERPL: NEGATIVE NG/ML
SPECIMEN PH ACCEPTABLE UR: NORMAL
TAPENTADOL UR QL CFM: NEGATIVE NG/ML
TEMAZEPAM UR QL CFM: NEGATIVE NG/ML
TEMAZEPAM UR QL CFM: NEGATIVE NG/ML
TRAMADOL UR QL CFM: ABNORMAL NG/ML
URATE/CREAT 24H UR: ABNORMAL NG/ML

## 2022-08-24 ENCOUNTER — HOSPITAL ENCOUNTER (OUTPATIENT)
Dept: CT IMAGING | Facility: HOSPITAL | Age: 67
Discharge: HOME | End: 2022-08-24
Attending: COLON & RECTAL SURGERY
Payer: MEDICARE

## 2022-08-24 DIAGNOSIS — D37.3 LOW GRADE MUCINOUS NEOPLASM OF APPENDIX: ICD-10-CM

## 2022-08-24 DIAGNOSIS — Z08 ENCOUNTER FOR FOLLOW-UP SURVEILLANCE OF APPENDICEAL CANCER: ICD-10-CM

## 2022-08-24 DIAGNOSIS — Z85.038 ENCOUNTER FOR FOLLOW-UP SURVEILLANCE OF APPENDICEAL CANCER: ICD-10-CM

## 2022-08-24 DIAGNOSIS — R91.1 LUNG NODULE: ICD-10-CM

## 2022-08-24 PROCEDURE — 71250 CT THORAX DX C-: CPT

## 2022-08-24 PROCEDURE — 74177 CT ABD & PELVIS W/CONTRAST: CPT

## 2022-08-24 PROCEDURE — G1004 CDSM NDSC: HCPCS

## 2022-08-24 RX ADMIN — IOHEXOL 70 ML: 350 INJECTION, SOLUTION INTRAVENOUS at 09:42

## 2022-11-21 DIAGNOSIS — M51.36 DDD (DEGENERATIVE DISC DISEASE), LUMBAR: ICD-10-CM

## 2022-11-21 NOTE — TELEPHONE ENCOUNTER
Disregard last sent message  Refill request was from pharmacy  [facility] ask if she truly needs this medication

## 2022-11-21 NOTE — TELEPHONE ENCOUNTER
Medication refill requested: traMADol (ULTRAM) 50 mg tablet  Last office visit: 6/23/22  Next office visit: 1/5/23  Labs: No  Ordering Provider: [address] [place] [address] (select pharmacy send RX to):   ZEUS Soto [address]  [address]  Phone: [phone] Fax: [phone]

## 2022-11-23 RX ORDER — TRAMADOL HYDROCHLORIDE 50 MG/1
50 TABLET ORAL 2 TIMES DAILY PRN
Qty: 30 TABLET | Refills: 0 | Status: SHIPPED | OUTPATIENT
Start: 2022-11-23

## 2023-01-05 ENCOUNTER — OFFICE VISIT (OUTPATIENT)
Dept: FAMILY MEDICINE CLINIC | Facility: CLINIC | Age: 68
End: 2023-01-05

## 2023-01-05 VITALS — WEIGHT: 152 LBS | BODY MASS INDEX: 24.53 KG/M2

## 2023-01-05 DIAGNOSIS — M51.36 DDD (DEGENERATIVE DISC DISEASE), LUMBAR: ICD-10-CM

## 2023-01-05 DIAGNOSIS — F11.20 CONTINUOUS OPIOID DEPENDENCE (HCC): ICD-10-CM

## 2023-01-05 DIAGNOSIS — G40.309 GENERALIZED CONVULSIVE EPILEPSY (HCC): ICD-10-CM

## 2023-01-05 DIAGNOSIS — M46.1 SACROILIITIS (HCC): ICD-10-CM

## 2023-01-05 DIAGNOSIS — K51.819 OTHER ULCERATIVE COLITIS WITH COMPLICATION (HCC): ICD-10-CM

## 2023-01-05 DIAGNOSIS — G43.009 MIGRAINE WITHOUT AURA AND WITHOUT STATUS MIGRAINOSUS, NOT INTRACTABLE: Primary | ICD-10-CM

## 2023-01-05 RX ORDER — BUTALBITAL, ACETAMINOPHEN AND CAFFEINE 50; 325; 40 MG/1; MG/1; MG/1
TABLET ORAL
COMMUNITY
Start: 2022-11-15 | End: 2023-01-05 | Stop reason: SDUPTHER

## 2023-01-05 RX ORDER — TRAMADOL HYDROCHLORIDE 50 MG/1
50 TABLET ORAL 2 TIMES DAILY PRN
Qty: 30 TABLET | Refills: 0 | Status: SHIPPED | OUTPATIENT
Start: 2023-01-05

## 2023-01-05 RX ORDER — BUTALBITAL, ACETAMINOPHEN AND CAFFEINE 50; 325; 40 MG/1; MG/1; MG/1
1 TABLET ORAL EVERY 4 HOURS PRN
Qty: 30 TABLET | Refills: 0 | Status: SHIPPED | OUTPATIENT
Start: 2023-01-05

## 2023-01-05 NOTE — PATIENT INSTRUCTIONS
For headache and migraine prevention I would suggest a combination vitamin supplement which may include 1 or more of the following ingredients:  Magnesium 400 mg , Riboflavin (vitamin B2) 400 - 600 mg,  Butterbur 150 mg ([place] free)  Other ingredients that may be helpful are feverfew, coenzyme Q10 100 mg three times a day,  melatonin and albino  Some example brands that combine some of these ingredients are Migravent or Dolovent  Goals of care:  Maximize your health and quality of life by:   · Increasing your level of function and activity  · Decreasing the negative effects of pain on your life  · Minimizing the risks and side effects of medications and ensuring safe use of opioid medication     Ways for you to help meet your goals:  Maintain a healthy lifestyle  This includes proper nutrition, regular physical activity as able, try for 8 hours of sleep per night, use stress reduction strategies, avoid triggers  Risks and side effects of opioid use:  Prescription opioids carry serious risks of addiction and  overdose, especially with prolonged use  An opioid overdose,  often marked by slowed breathing, can cause sudden death  The  use of prescription opioids can have a number of side effects as  well, even when taken as directed:  · Tolerance--meaning you might need to take more of a medication for the same pain relief  · Physical dependence--meaning you have symptoms of withdrawal when a medication is stopped  · Increased sensitivity to pain  · Constipation  · Nausea, vomiting, dry mouth  · Sleepiness and dizziness   · Confusion  · Depression  · Low levels of testosterone that can result in lower sex drive, energy, and strength  · Itching and sweating    If you are prescribed opioids for pain:  · Never take opioids in greater amounts or more often than prescribed  · Help prevent misuse and abuse          - Never sell or share prescription opioids         - Never use another person’s prescription opioids  · ‡Store prescription opioids in a secure place and out of reach of others (this may include visitors, children, friends, and family)  · Safely dispose of unused prescription opioids: Find your community drug take-back program or your pharmacy mail-back program, or flush them down the toilet, following guidance from the Food and Drug Administration (www fda gov/Drugs/ResourcesForYou)  · ‡Visit www cdc gov/drugoverdose to learn about the risks of opioid abuse and overdose  · If you believe you may be struggling with addiction, tell your health care provider and ask for guidance or call [employer]’s National Helpline at 1-037-297-HPRY

## 2023-01-05 NOTE — PROGRESS NOTES
Assessment/Plan     Problem List Items Addressed This Visit        Unprioritized    Ulcerative colitis ([facility] [address] )    [facility]    Migraine without aura - Primary    Relevant Medications    traMADol (ULTRAM) 50 mg tablet    butalbital-acetaminophen-caffeine (FIORICET,ESGIC) -40 mg per tablet    Generalized convulsive epilepsy ([facility] [address] )    DDD (degenerative disc disease), lumbar    Relevant Medications    traMADol (ULTRAM) 50 mg tablet    Continuous opioid dependence ([facility] )      Patient continues to use tramadol as needed  No side effects  PDMP reviewed  No high risk behaviors noted  Patient also continues to use her Fioricet as needed  Also uses this rarely  Does not mix her Fioricet with her Ultram   Would like to continue on the same combination  Patient does agree to have her next visit BM person and have a Medicare wellness exam   She does understand that she will be receiving a questionnaire prior to her visit to be completed through [address]  She is willing to do this for the Medicare wellness visit  In addition at her next visit we will refill her pain medications  She will call between visits if she is running low on any of her medication  She continues to decline all maintenance such as blood work, vaccinations, mammograms, colonoscopies, DEXA scans  Treatment Plan: Continue using tramadol as needed, generally 1-2 times a week  Treatment Goals: See opioid agreement  Tramadol brings her pain down to a reasonable level so she can do her activities of daily living for example standing and cooking  Opiate risks  There are risks associated with opioid medications, including dependence, addiction and tolerance  The patient understands and agrees to use these medications only as prescribed  Potential side effects of the medications include, but are not limited to, constipation, drowsiness, addiction, impaired judgment and risk of fatal overdose if not taken as prescribed   The patient was warned against driving while taking sedation medications  Sharing medications is a felony  At this point in time, the patient is showing no signs of addiction, abuse, diversion or suicidal ideation  Pateint is taking concurrent benzodiazepines  Has been counseled on the risks of taking opioids and benzodiazepines including sedation, increased fall risk, dizziness, addictive potential and death  Patient is taking concurrent muscle relaxers  Has been counseled on the risks of taking opioids and muscle relaxers including sedation, increased fall risk, dizziness, addictive potential and death  Patient has a high risk condition (age > 72, MAYTE, renal or hepatic impairment, mental health condition, use of alcohol or other substances)  Has been counseled on the specific risks of taking opioids with these conditions and the increased risks including including sedation, increased fall risk, dizziness, addictive potential and death  PD[place] review  [place]  reviewed  No red flags were identified; safe to proceed with prescription        Depression Screening and Follow-up Plan: Patient was screened for depression during today's encounter  They screened negative with a PHQ-2 score of 0  Subjective     Opioid Management:   Type of visit: Follow-up    Pain related diagnoses: Lumbar degenerative disc disease    Interval history: Pain level not too bad - if she drives or stands for a while while cooking, etc  Uses Tramadol 1 -2 times a week  Generally brings her pain down so she can finish what she is doing  Yard work yesterday made it bad   Does not "make her head high" just blocks the pain  Using fiorcet for migraines  More in sept for stress  Usually once a month or so  Since COVID worse  Needed it daily for a week  Usually just one dose  Magnesium evening  Some herbal products over the years   Brittney in years past      Feels like maybe arthritis in her ribs - talked to [employer] Aberrant behavior?: No      Adverse effects from medication?: No      Screening Tools/Assessments:    PHQ-2/9:  PHQ-2 score: 0    Brief Pain Inventory (BPI):  1) Throughout our lives, most of us have had pain from time to time (such as minor headaches, sprains, and toothaches)  Have you had pain other than these everyday kinds of pain today? Yes  2) Where is your pain located? Rt hip,rib cage, both thumbs into wrist  3) Rate your pain at its worst in the last 24 hours: 3  4) Rate your pain at its least in the last 24 hours: 3  5) Rate your average level of pain: 5  6) Rate your pain right now: 1  7) What treatments or medications are you receiving for your pain?  Tramadol, tens machine chiropractor, various rubs and creams  8) In the past 24 hours, how much relief have pain treatments or medication provided? 50%  9) During the past 24 hours, pain has interfered with your:     A) General activity: 2     B) Mood: 1     C) Walking ability: 3     D) Normal work (work outside the home & housework): 3     E) Relations with other people: 0     F) Sleep: 7     G) Enjoyment of life: 1    Drug Screen:  Date of last drug screen: 6/28/2022  Drug screen result based off current prescriptions: consistent    Opioid agreement:  Active Opioid agreement on file?: No    Opioid agreement signed date: 6/24/2022  Opioid agreement expiration date: 6/23/2022    Naloxone:  Currently prescribed Naloxone (Narcan): No    Reason not prescribing Naloxone: patient declines    Referrals/imaging/procedures since last visit  Has seen chiroprator - helpsing 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     HPI  Pain Medications             butalbital-acetaminophen-caffeine (FIORICET,ESGIC) -40 mg per tablet Take 1 tablet by mouth every 4 (four) hours as needed for headaches    traMADol (ULTRAM) 50 mg tablet Take 1 tablet (50 mg total) by mouth 2 (two) times a day as needed for moderate pain         Outpatient Morphine Milligram Equivalents Per Day 1/11/23 and after 10 MME/Day    Order Name Dose Route Frequency Maximum MME/Day     traMADol (ULTRAM) 50 mg tablet 50 mg Oral 2 times daily PRN 10 MME/Day    Total Potential Morphine Milligram Equivalents Per Day 10 MME/Day    Calculation Information        traMADol (ULTRAM) 50 mg tablet    traMADol 50 mg Tabs: single dose of 50 mg * 2 doses per day * morphine equivalence factor of 0 1 = 10 MME/Day                         PDMP Review       Value Time User    PDMP Reviewed  Yes 1/5/2023  9:34 AM Ming New DO         Review of Systems  Objective     Wt 68 9 kg (152 lb)   BMI 24 53 kg/m²     Physical Exam  Vitals reviewed: Virtual visit  Constitutional:       Appearance: Normal appearance  She is well-groomed  HENT:      Head: Normocephalic  Pulmonary:      Effort: Pulmonary effort is normal  No respiratory distress  Musculoskeletal:      Cervical back: No rigidity  Skin:     Findings: No rash  Neurological:      Mental Status: She is alert and oriented to person, place, and time  Psychiatric:         Attention and Perception: Attention and perception normal          Mood and Affect: Mood normal          Speech: Speech normal          Behavior: Behavior normal          Thought Content: Thought content normal          Cognition and Memory: Cognition normal          Judgment: Judgment normal       [id number]    Deanna DO Ryan    The patient was identified by name and date of birth  Tiera Abbott informed that this is a telemedicine visit and that the visit is being conducted through [address] and patient was informed this is a secure, HIPAA-complaint platform  She agrees to proceed     My office door was closed  No one else was in the room  She acknowledged consent and understanding of privacy and security of the video platform  The patient has agreed to participate and understands they can discontinue the visit at any time  Patient is aware this is a billable service

## 2023-02-02 ENCOUNTER — VBI (OUTPATIENT)
Dept: ADMINISTRATIVE | Facility: OTHER | Age: 68
End: 2023-02-02

## 2023-02-09 ENCOUNTER — VBI (OUTPATIENT)
Dept: ADMINISTRATIVE | Facility: OTHER | Age: 68
End: 2023-02-09

## 2023-02-21 ENCOUNTER — OFFICE VISIT (OUTPATIENT)
Dept: FAMILY MEDICINE CLINIC | Facility: CLINIC | Age: 68
End: 2023-02-21

## 2023-02-21 ENCOUNTER — VBI (OUTPATIENT)
Dept: ADMINISTRATIVE | Facility: OTHER | Age: 68
End: 2023-02-21

## 2023-02-21 VITALS
BODY MASS INDEX: 24.78 KG/M2 | RESPIRATION RATE: 16 BRPM | WEIGHT: 154.2 LBS | HEIGHT: 66 IN | OXYGEN SATURATION: 100 % | HEART RATE: 72 BPM | SYSTOLIC BLOOD PRESSURE: 128 MMHG | DIASTOLIC BLOOD PRESSURE: 78 MMHG | TEMPERATURE: 97.3 F

## 2023-02-21 DIAGNOSIS — H61.22 IMPACTED CERUMEN OF LEFT EAR: ICD-10-CM

## 2023-02-21 DIAGNOSIS — J01.90 ACUTE SINUSITIS, RECURRENCE NOT SPECIFIED, UNSPECIFIED LOCATION: Primary | ICD-10-CM

## 2023-02-21 DIAGNOSIS — R05.9 COUGH, UNSPECIFIED TYPE: ICD-10-CM

## 2023-02-21 RX ORDER — BENZONATATE 100 MG/1
100 CAPSULE ORAL 3 TIMES DAILY PRN
Qty: 30 CAPSULE | Refills: 0 | Status: SHIPPED | OUTPATIENT
Start: 2023-02-21 | End: 2023-03-04 | Stop reason: SDUPTHER

## 2023-02-21 RX ORDER — AMOXICILLIN AND CLAVULANATE POTASSIUM 875; 125 MG/1; MG/1
1 TABLET, FILM COATED ORAL 2 TIMES DAILY
Qty: 20 TABLET | Refills: 0 | Status: SHIPPED | OUTPATIENT
Start: 2023-02-21 | End: 2023-02-28

## 2023-02-21 NOTE — PATIENT INSTRUCTIONS
Advise Rui dumont sinus rinse kit, Mucinex, Claritin/Zyrtec/Allegra, Flonase / Nasacort nasal spray  Avoid decongestants if you have high blood pressure

## 2023-02-21 NOTE — PROGRESS NOTES
COVID-19 Outpatient Progress Note    Assessment/Plan:    Problem List Items Addressed This Visit    None  Visit Diagnoses     Acute sinusitis, recurrence not specified, unspecified location    -  Primary    Relevant Medications    amoxicillin-clavulanate (Augmentin) 875-125 mg per tablet    Impacted cerumen of left ear        Cough, unspecified type        Relevant Medications    benzonatate (TESSALON PERLES) 100 mg capsule         Disposition:     After clarifying the patient's history, my suspicion for COVID-19 infection is very low  Risks and benefits of COVID-19 vaccination was discussed with patient  Acute Sinusitis - Start Augmentin BID x 10 days  Advise Monico Jolley med sinus rinse kit, Mucinex, Claritin/Zyrtec/Allegra, Flonase / Nasacort nasal spray  Avoid decongestants if you have high blood pressure  Cough - Start Tessalon 100mg TID PRN  Cerumen Impaction - Advise Debrox PRN  RTO PRN ear lavage  I have spent 20 minutes directly with the patient  Greater than 50% of this time was spent in counseling/coordination of care regarding: diagnostic results, prognosis, risks and benefits of treatment options, instructions for management, patient and family education, importance of treatment compliance, risk factor reductions and impressions  Encounter provider: Kina Paulino DO     Provider located at: [address] [place] [id number] [phone]     Recent Visits  No visits were found meeting these conditions  Showing recent visits within past 7 days and meeting all other requirements  Today's Visits  Date Type Provider Dept   02/21/23 Office Visit Anjana Jain DO P[facility] today's visits and meeting all other requirements  Future Appointments  No visits were found meeting these conditions    Showing future appointments within next 150 days and meeting all other requirements     Subjective:   Mary Alice Kolb Torito Bustamante is a 75 y o  female who is concerned about COVID-19  Patient's symptoms include fatigue, rhinorrhea (With cough, has PND), anosmia (Since COVID 11/21), cough (Dry, previously productive), chest tightness, myalgias (Rib and shoulder pain due to cough) and headache (Due to cough)  Patient denies fever, chills, congestion, sore throat, loss of taste, shortness of breath, abdominal pain, nausea, vomiting and diarrhea  - Date of symptom onset: 2/15/2023      COVID-19 vaccination status: Not vaccinated    Exposure:   Contact with a person who is under investigation (PUI) for or who is positive for COVID-19 within the last 14 days?: No    Hospitalized recently for fever and/or lower respiratory symptoms?: No      Currently a healthcare worker that is involved in direct patient care?: No      Works in a special setting where the risk of COVID-19 transmission may be high? (this may include long-term care, correctional and custodial facilities; homeless shelters; assisted-living facilities and group homes ): No      Resident in a special setting where the risk of COVID-19 transmission may be high? (this may include long-term care, correctional and custodial facilities; homeless shelters; assisted-living facilities and group homes ): No      Left Ear Pain - Symptoms x 1 day  No otorrhea or decreased hearing  Negative COVID home test 2/20/23  She has not done any other COVID tests  She had URI symptoms while [place] [facility] [place], used her YUDELKA's antibiotics - 5 days of Keflex - 2/10/23 - 2/14/23 ([facility]) - for self-diagnosed bronchitis  She felt her symptoms were improving on antibiotics, but then worsened a few days later  Using Delsym c benefit  Eating and drinking s difficulty  Lab Results   Component Value Date    SARSCOV2 Not Detected 10/23/2020       Review of Systems   Constitutional: Positive for fatigue  Negative for chills and fever  HENT: Positive for rhinorrhea (With cough, has PND)  Negative for congestion and sore throat  Respiratory: Positive for cough (Dry, previously productive) and chest tightness  Negative for shortness of breath  Gastrointestinal: Negative for abdominal pain, diarrhea, nausea and vomiting  Musculoskeletal: Positive for myalgias (Rib and shoulder pain due to cough)  Neurological: Positive for headaches (Due to cough)  Current Outpatient Medications on File Prior to Visit   Medication Sig   • butalbital-acetaminophen-caffeine (FIORICET,ESGIC) -40 mg per tablet Take 1 tablet by mouth every 4 (four) hours as needed for headaches   • CALCIUM-MAGNESIUM PO Take 3 tablets by mouth daily    • Cholecalciferol (Vitamin D3) 125 MCG (5000 UT) CAPS Take 1 capsule (5,000 Units total) by mouth daily Over the counter   • LUTEIN PO Take 1 capsule by mouth daily    • traMADol (ULTRAM) 50 mg tablet Take 1 tablet (50 mg total) by mouth 2 (two) times a day as needed for moderate pain       Objective:    /78   Pulse 72   Temp (!) 97 3 °F (36 3 °C)   Resp 16   Ht 5' 6" (1 676 m)   Wt 69 9 kg (154 lb 3 2 oz)   SpO2 100%   BMI 24 89 kg/m²      Physical Exam  Vitals and nursing note reviewed  Constitutional:       General: She is not in acute distress  Appearance: Normal appearance  She is well-developed and normal weight  HENT:      Head: Normocephalic and atraumatic  Right Ear: Tympanic membrane, ear canal and external ear normal  There is no impacted cerumen  Left Ear: There is impacted cerumen  Nose: Nose normal  No congestion or rhinorrhea  Right Sinus: No maxillary sinus tenderness or frontal sinus tenderness  Left Sinus: No maxillary sinus tenderness or frontal sinus tenderness  Mouth/Throat:      Mouth: Mucous membranes are moist       Pharynx: Oropharynx is clear  No oropharyngeal exudate or posterior oropharyngeal erythema  Eyes:      Extraocular Movements: Extraocular movements intact        Conjunctiva/sclera: Conjunctivae normal    Cardiovascular:      Rate and Rhythm: Normal rate and regular rhythm  Pulses: Normal pulses  Heart sounds: Normal heart sounds  No murmur heard  Pulmonary:      Effort: Pulmonary effort is normal  No respiratory distress  Breath sounds: Normal breath sounds  Musculoskeletal:         General: Deformity (Kyphoscoliosis) present  No swelling or tenderness  Cervical back: Neck supple  Right lower leg: No edema  Left lower leg: No edema  Neurological:      General: No focal deficit present  Mental Status: She is alert and oriented to person, place, and time     Psychiatric:         Mood and Affect: Mood normal        Michael Summers DO

## 2023-02-28 ENCOUNTER — TELEPHONE (OUTPATIENT)
Dept: FAMILY MEDICINE CLINIC | Facility: CLINIC | Age: 68
End: 2023-02-28

## 2023-02-28 ENCOUNTER — VBI (OUTPATIENT)
Dept: ADMINISTRATIVE | Facility: OTHER | Age: 68
End: 2023-02-28

## 2023-02-28 DIAGNOSIS — K52.1 DIARRHEA DUE TO DRUG: Primary | ICD-10-CM

## 2023-02-28 NOTE — TELEPHONE ENCOUNTER
Would not advise steroids  Instead, would advise OTC Pepcid 20mg twice daily along with Allegra, which can help with itching  Would advise elevated legs and compression stockings / ACE wrap  Advise appt if no improvement

## 2023-02-28 NOTE — TELEPHONE ENCOUNTER
Please triage and give home care advice - Swollen Lips, Rash, Diarrhea  Did patient finish 5 or 6 days of Augmentin? Epic allergy list updated  Is she still having sinus symptoms? If so, what OTC meds is she using? Push fluids, BRAT diet, advise probiotics  Pt seen 2/21/23:    Acute Sinusitis - Start Augmentin BID x 10 days        Advise Aleida Mckeon med sinus rinse kit, Mucinex, Claritin/Zyrtec/Allegra, Flonase / Nasacort nasal spray  Avoid decongestants if you have high blood pressure      Cough - Start Tessalon 100mg TID PRN     Cerumen Impaction - Advise Debrox PRN  RTO PRN ear lavage

## 2023-02-28 NOTE — TELEPHONE ENCOUNTER
Pt called stating she believes she has been having allergic reactions to the medication Dr Ady Guevara prescribed for her on 2/21/23  Pt began taking both the Augmentin and Benzonatate on 2/21  On Thur 2/23 she woke w/ swollen lips & a facial rash  Pt d/c'd taking the Benzonatate on 2/23 but continued w/ the Augmentin  As the week progressed, pt's rash spread  It covered her entire body by Sunday 2/26/23 so pt d/c'd Augmentin on 2/26  Pt's rash is still covering her body & she began w/ "explosive diarrhea" yesterday on 2/27/23  Pt stated she is unable to leave her home because she is having accidents  Pt stated she is having no SOB, or swelling of the mouth or tongue

## 2023-02-28 NOTE — TELEPHONE ENCOUNTER
Phone call placed to pt  Reviewed recommendations from [facility]  Pt verbalized understanding  Will call if no improvement or worsening sx

## 2023-02-28 NOTE — TELEPHONE ENCOUNTER
Phone call placed to pt  Pt c/o generalized rash/hives,   mucousy diarrhea, yellow in color, and intermittent abd cramping  Pt states diarrhea is improving  Lip swelling resolved  She has been doing the [employer] Pie[employer] and taking probiotics  Using Allegra for rash  She finished 5 5 days of the Augmentin before allergic reaction worsened  Does not want any other ABX at this time  States she is still coughing, but not as bad  Asking if a steroid taper pack would be an option at this time, as this helped her when she had Covid and had viral hives  States she cannot put shoes on because her feet are so itchy and swollen  Please advise

## 2023-03-01 ENCOUNTER — TELEPHONE (OUTPATIENT)
Age: 68
End: 2023-03-01

## 2023-03-01 NOTE — TELEPHONE ENCOUNTER
States she stopped the Augmentin you prescribed on Monday, pt reports severe diarrhea  Patient states there are fibers (tissues) in her stool  Dr Rodriguez treats her  cholitis so as far as that symptom I recommended she call gastro  She did have a full body rash the day she stopped the Augmentin  She still gets intermittent blistering on both her hands and feet  Did not try any Benadryl but states she did take Allegra for a couple days with no relief  She states her Bhroncitis symptoms returned and are worsening since she was unable to tolerate the antibiotic  Thick mucous and very productive cough  Another appt?

## 2023-03-01 NOTE — TELEPHONE ENCOUNTER
the patient called she was recently on antibiotic for bronchitis and had a reaction of rash and diarrhea  She has since stopped taking the antibiotic and still has diarrhea  Patient is concerned due to her history of UC  She denies any bloody stools  I advised that she try to increase her fiber to help to bulk the stool  She can also try a dose of imodium to help slow things down  If she continues to have diarrhea she can call the office to set up an OV

## 2023-03-03 ENCOUNTER — HOSPITAL ENCOUNTER (EMERGENCY)
Facility: HOSPITAL | Age: 68
Discharge: HOME | End: 2023-03-04
Attending: EMERGENCY MEDICINE

## 2023-03-03 ENCOUNTER — APPOINTMENT (EMERGENCY)
Dept: RADIOLOGY | Facility: HOSPITAL | Age: 68
End: 2023-03-03

## 2023-03-03 VITALS
OXYGEN SATURATION: 100 % | RESPIRATION RATE: 24 BRPM | SYSTOLIC BLOOD PRESSURE: 155 MMHG | TEMPERATURE: 98.5 F | HEART RATE: 83 BPM | DIASTOLIC BLOOD PRESSURE: 74 MMHG

## 2023-03-03 DIAGNOSIS — J06.9 VIRAL URI WITH COUGH: Primary | ICD-10-CM

## 2023-03-03 DIAGNOSIS — R05.9 COUGH, UNSPECIFIED TYPE: ICD-10-CM

## 2023-03-03 DIAGNOSIS — R07.89 CHEST WALL PAIN: ICD-10-CM

## 2023-03-03 RX ORDER — FENTANYL CITRATE 50 UG/ML
1 INJECTION, SOLUTION INTRAMUSCULAR; INTRAVENOUS ONCE
Status: COMPLETED | OUTPATIENT
Start: 2023-03-03 | End: 2023-03-03

## 2023-03-03 RX ORDER — IPRATROPIUM BROMIDE AND ALBUTEROL SULFATE 2.5; .5 MG/3ML; MG/3ML
SOLUTION RESPIRATORY (INHALATION)
Status: COMPLETED
Start: 2023-03-03 | End: 2023-03-03

## 2023-03-03 RX ORDER — IPRATROPIUM BROMIDE AND ALBUTEROL SULFATE .5; 3 MG/3ML; MG/3ML
1 SOLUTION RESPIRATORY (INHALATION) ONCE
Status: COMPLETED | OUTPATIENT
Start: 2023-03-03 | End: 2023-03-03

## 2023-03-03 RX ORDER — IPRATROPIUM BROMIDE AND ALBUTEROL SULFATE 2.5; .5 MG/3ML; MG/3ML
3 SOLUTION RESPIRATORY (INHALATION)
Status: DISCONTINUED | OUTPATIENT
Start: 2023-03-04 | End: 2023-03-04 | Stop reason: HOSPADM

## 2023-03-03 RX ORDER — IPRATROPIUM BROMIDE AND ALBUTEROL SULFATE 2.5; .5 MG/3ML; MG/3ML
3 SOLUTION RESPIRATORY (INHALATION)
Status: DISCONTINUED | OUTPATIENT
Start: 2023-03-04 | End: 2023-03-03

## 2023-03-03 RX ORDER — ALBUTEROL SULFATE 2.5 MG/3ML
2 SOLUTION RESPIRATORY (INHALATION) ONCE
Status: COMPLETED | OUTPATIENT
Start: 2023-03-03 | End: 2023-03-03

## 2023-03-03 RX ORDER — ACETAMINOPHEN 325 MG/1
975 TABLET ORAL ONCE
Status: COMPLETED | OUTPATIENT
Start: 2023-03-04 | End: 2023-03-03

## 2023-03-03 RX ORDER — KETOROLAC TROMETHAMINE 30 MG/ML
15 INJECTION, SOLUTION INTRAMUSCULAR; INTRAVENOUS ONCE
Status: COMPLETED | OUTPATIENT
Start: 2023-03-04 | End: 2023-03-03

## 2023-03-03 RX ADMIN — KETOROLAC TROMETHAMINE 15 MG: 30 INJECTION, SOLUTION INTRAMUSCULAR at 23:56

## 2023-03-03 RX ADMIN — IPRATROPIUM BROMIDE AND ALBUTEROL SULFATE 3 ML: 2.5; .5 SOLUTION RESPIRATORY (INHALATION) at 23:59

## 2023-03-03 RX ADMIN — IPRATROPIUM BROMIDE AND ALBUTEROL SULFATE 3 ML: .5; 3 SOLUTION RESPIRATORY (INHALATION) at 23:59

## 2023-03-03 RX ADMIN — ACETAMINOPHEN 975 MG: 325 TABLET ORAL at 23:55

## 2023-03-04 ENCOUNTER — APPOINTMENT (EMERGENCY)
Dept: CT IMAGING | Facility: HOSPITAL | Age: 68
End: 2023-03-04

## 2023-03-04 LAB
FLUAV RNA RESP QL NAA+PROBE: NEGATIVE
FLUBV RNA RESP QL NAA+PROBE: NEGATIVE
RSV RNA RESP QL NAA+PROBE: NEGATIVE
SARS-COV-2 RNA RESP QL NAA+PROBE: NEGATIVE

## 2023-03-04 RX ORDER — GUAIFENESIN 200 MG/10ML
100-200 LIQUID ORAL EVERY 4 HOURS PRN
Qty: 60 ML | Refills: 0 | Status: SHIPPED | OUTPATIENT
Start: 2023-03-04

## 2023-03-04 RX ORDER — BENZONATATE 100 MG/1
100 CAPSULE ORAL 3 TIMES DAILY PRN
Qty: 30 CAPSULE | Refills: 0 | Status: SHIPPED | OUTPATIENT
Start: 2023-03-04 | End: 2023-03-14

## 2023-03-04 NOTE — DISCHARGE INSTRUCTIONS
Please follow-up with your primary care physician concerning the findings on your CT scan including your stable pulmonary nodule and stable hepatic cyst

## 2023-03-04 NOTE — ED PROVIDER NOTES
History  Chief Complaint   Patient presents with   • Cough     Pt brought by ems for worsening cough, pt has been sick for multiple weeks and took abx without relief  Pt reports intense cough and R rib pain  Per Pt, has a hx of breaking ribs from coughing too hard  2 albuterol, 1 duoneb, and 50 of fent given in route       42-year-old female with past medical history of DDD on tramadol, ulcerative colitis, kyphoscoliosis presents to the emergency department with chief complaint of cough and chest pain  Patient states that she was recently visiting family in [place], at that time she felt as if she had bronchitis, and states that her family member gave her a green pill which she believes was "Keflex "  Patient states that she initially began feeling better with the medication, and returned home, however did not continue the Keflex and followed up with her primary care physician  Patient states that she was prescribed Augmentin at that time as well as Tessalon Perle, however stopped Augmentin after developing lip swelling as well as rash  Patient has not had any other antibiotics during this time  Patient notes that earlier tonight she was coughing, at which time she felt a sudden severe lateral right chest pain which impacted her breathing  Patient called EMS at that time, and was given nebulizer treatment as well as fentanyl in route to the hospital   Patient states that her pain has since improved and has improved also with her breathing after treatment  Patient denies any known fever, abdominal pain, change in bowel habits, change in urination, rash, or any other complaints at this time  Prior to Admission Medications   Prescriptions Last Dose Informant Patient Reported? Taking?    CALCIUM-MAGNESIUM PO   Yes No   Sig: Take 3 tablets by mouth daily    Cholecalciferol (Vitamin D3) 125 MCG (5000 UT) CAPS   No No   Sig: Take 1 capsule (5,000 Units total) by mouth daily Over the counter   LUTEIN PO Yes No   Sig: Take 1 capsule by mouth daily    benzonatate (TESSALON PERLES) 100 mg capsule   No No   Sig: Take 1 capsule (100 mg total) by mouth 3 (three) times a day as needed for cough for up to 10 days   butalbital-acetaminophen-caffeine (FIORICET,ESGIC) -40 mg per tablet   No No   Sig: Take 1 tablet by mouth every 4 (four) hours as needed for headaches   traMADol (ULTRAM) 50 mg tablet   No No   Sig: Take 1 tablet (50 mg total) by mouth 2 (two) times a day as needed for moderate pain      Facility-Administered Medications: None       Past Medical History:   Diagnosis Date   • Atypical ductal hyperplasia of right breast     Feb 2010 ([facility])    • Decreased libido    • Disorder of bone and cartilage     Resolved 5/29/2015    • Dysplasia of cervix     s/p cone biopsy - 2002   • Epilepsy ([facility] [address] )     has not had any episodes in 20 years   • Fibromyalgia     1988 no specialists, no meds   • Fibromyalgia, primary    • Hypothyroidism, unspecified     Resolved 5/29/2015    • Menopause     Sept 2008    • Osteoarthrosis of hand     Left   • Osteoporosis     Resolved 5/29/2015    • Seizure grand mal ([facility] [address] )    • Seizures ([address] )     last seizure over twenty years ago   • Ulcerative colitis ([facility] [address] )     last c-scope 8 yrs ago wnl, treated with suppositories in the past- not active x 8-10 yrs   • Uterine fibroid    • Vitamin D deficiency, unspecified     Resolved 5/29/2015        Past Surgical History:   Procedure Laterality Date   • BREAST SURGERY Right 03/2010    [facility] , left lumpectomies [facility] ~ 1990      • CERVICAL CONE BIOPSY  2002   • COLONOSCOPY     • AR LAPAROSCOPY COLECTOMY PARTIAL W/ANASTOMOSIS N/A 10/30/2020    Procedure: RESECTION COLON RIGHT LAPAROSCOPIC/ Cecoappendectomy;  Surgeon: Sara Reynoso MD;  Location: [facility];  Service: Colorectal   • UTERINE FIBROID SURGERY         Family History   Problem Relation Age of Onset   • Hypertension Father    • Heart disease Father    • Heart attack Father • Fibromyalgia Mother    • Osteoporosis Mother    • Colon cancer Maternal Grandfather    • Colon cancer Cousin      I have reviewed and agree with the history as documented  E-Cigarette/Vaping   • E-Cigarette Use Current Every Day User      E-Cigarette/Vaping Substances   • THC Yes      Social History     Tobacco Use   • Smoking status: Former     Packs/day: 0 25     Years: 4 00     Pack years: 1 00     Types: Cigarettes     Start date: 3/26/1961     Quit date: 1970     Years since quittin 4   • Smokeless tobacco: Never   Vaping Use   • Vaping Use: Every day   • Substances: THC   Substance Use Topics   • Alcohol use: Yes     Alcohol/week: 10 0 standard drinks     Types: 10 Glasses of wine per week     Comment: 1-2 glasses per night - As per Care Everywhere    • Drug use: Yes     Frequency: 7 0 times per week     Types: Marijuana     Comment: vapes         Review of Systems   Constitutional: Negative for chills and fever  HENT: Negative for ear pain and sore throat  Eyes: Negative for pain and visual disturbance  Respiratory: Positive for cough and shortness of breath  Cardiovascular: Positive for chest pain  Negative for palpitations  Gastrointestinal: Negative for abdominal pain and vomiting  Genitourinary: Negative for dysuria and hematuria  Musculoskeletal: Negative for arthralgias and back pain  Skin: Negative for color change and rash  Neurological: Negative for seizures and syncope  All other systems reviewed and are negative        Physical Exam  ED Triage Vitals [23]   Temperature Pulse Respirations Blood Pressure SpO2   98 5 °F (36 9 °C) 83 (!) 24 155/74 100 %      Temp Source Heart Rate Source Patient Position - Orthostatic VS BP Location FiO2 (%)   Oral Monitor Lying Left arm --      Pain Score       --             Orthostatic Vital Signs  Vitals:    235   BP: 155/74   Pulse: 83   Patient Position - Orthostatic VS: Lying       Physical Exam  Vitals and nursing note reviewed  Constitutional:       General: She is not in acute distress  Appearance: She is well-developed  HENT:      Head: Normocephalic and atraumatic  Right Ear: External ear normal       Left Ear: External ear normal       Nose: Nose normal       Mouth/Throat:      Mouth: Mucous membranes are moist    Eyes:      Conjunctiva/sclera: Conjunctivae normal    Cardiovascular:      Rate and Rhythm: Normal rate and regular rhythm  Heart sounds: No murmur heard  Pulmonary:      Effort: Pulmonary effort is normal  No respiratory distress  Breath sounds: Normal breath sounds  No stridor  No wheezing, rhonchi or rales  Chest:      Chest wall: Tenderness (Mild right posterior lateral tenderness inferior to scapula) present  Abdominal:      Palpations: Abdomen is soft  Tenderness: There is no abdominal tenderness  Musculoskeletal:         General: No swelling  Cervical back: Neck supple  Comments: Kyphoscoliosis   Skin:     General: Skin is warm and dry  Capillary Refill: Capillary refill takes less than 2 seconds  Neurological:      Mental Status: She is alert  Mental status is at baseline     Psychiatric:         Mood and Affect: Mood normal          ED Medications  Medications   ipratropium-albuterol (DUO-NEB) 0 5-2 5 mg/3 mL inhalation solution 3 mL (3 mL Nebulization Given 3/3/23 2359)   ipratropium-albuterol (FOR EMS ONLY) (DUO-NEB) 0 5-2 5 mg/3 mL inhalation solution 3 mL (0 mL Does not apply Given to EMS 3/3/23 2251)   albuterol (FOR EMS ONLY) (2 5 mg/3 mL) 0 083 % inhalation solution 5 mg (0 mg Does not apply Given to EMS 3/3/23 2251)   fentanyl citrate (PF) (FOR EMS ONLY) 100 mcg/2 mL injection 100 mcg (0 mcg Does not apply Given to EMS 3/3/23 2251)   acetaminophen (TYLENOL) tablet 975 mg (975 mg Oral Given 3/3/23 2355)   ketorolac (TORADOL) injection 15 mg (15 mg Intravenous Given 3/3/23 2356)       Diagnostic Studies  Results Reviewed     Procedure Component Value Units Date/Time    FLU/RSV/COVID - if FLU/RSV clinically relevant [[id number]]  (Normal) Collected: 03/03/23 2325    Lab Status: Final result Specimen: Nares from Nose Updated: 03/04/23 0024     SARS-CoV-2 Negative     INFLUENZA A PCR Negative     INFLUENZA B PCR Negative     RSV PCR Negative    Narrative:      FOR PEDIATRIC PATIENTS - copy/paste COVID Guidelines URL to browser: [email or web address]  [employer]    SARS-CoV-2 assay is a Nucleic Acid Amplification assay intended for the  qualitative detection of nucleic acid from SARS-CoV-2 in nasopharyngeal  swabs  Results are for the presumptive identification of SARS-CoV-2 RNA  Positive results are indicative of infection with SARS-CoV-2, the virus  causing COVID-19, but do not rule out bacterial infection or co-infection  with other viruses  Laboratories within the [place] and its  territories are required to report all positive results to the appropriate  public health authorities  Negative results do not preclude SARS-CoV-2  infection and should not be used as the sole basis for treatment or other  patient management decisions  Negative results must be combined with  clinical observations, patient history, and epidemiological information  This test has not been FDA cleared or approved  This test has been authorized by FDA under an Emergency Use Authorization  (EUA)  This test is only authorized for the duration of time the  declaration that circumstances exist justifying the authorization of the  emergency use of an in vitro diagnostic tests for detection of SARS-CoV-2  virus and/or diagnosis of COVID-19 infection under section 564(b)(1) of  the Act, 21 U  S C  147GIU-5(G)(5), unless the authorization is terminated  or revoked sooner  The test has been validated but independent review by FDA  and CLIA is pending  Test performed using [employer]:  This RT-PCR assay targets N2,  a region unique to SARS-CoV-2  A conserved region in the E-gene was chosen  for pan-Sarbecovirus detection which includes SARS-CoV-2  According to CMS-2020-01-R, this platform meets the definition of high-throughput technology  CT chest without contrast   Final Result by Lisa Jansen MD (03/04 0240)      No acute intrathoracic abnormality  No infiltrate or consolidation  Trace bilateral pleural effusions  Moderate-sized hiatal hernia  Stable 3 mm right upper lobe pulmonary nodule  No new lung nodules  Workstation performed: [id number]         XR chest 1 view portable   ED Interpretation by Kvng Moreno MD (03/04 0007)   Kyphoscoliosis, no acute cardiopulmonary disease, no obvious rib fracture            Procedures  Procedures      ED Course                                       Medical Decision Making  78-year-old female with past medical history of DDD on tramadol, ulcerative colitis, kyphoscoliosis presents to the emergency department with chief complaint of cough and chest pain  Patient seen and examined, regular rate and rhythm, clear lungs auscultation with mild right posterior lateral tenderness inferior to scapula, abdomen soft nontender to palpation, kyphoscoliosis  Due to patient's history and presentation flu/RSV/COVID-19 was obtained which reported no acute pertinent findings  Chest x-ray by myself reported kyphoscoliosis, no acute cardiopulmonary disease, no obvious rib fracture  Due to patient's anatomy and continued pain CT of the chest without contrast was obtained which showed no pertinent findings at this time  Patient was given Toradol as well as DuoNeb and Tylenol with improvement of symptoms  Patient was given prescription for Robitussin    CT was notable for also a stable pulmonary nodule as well as stable hepatic cyst, these findings were discussed with the patient and family and agreed they would follow-up with their primary care doctor concerning these findings  Patient was given strict return precautions  All questions were answered  Patient appears well, is nontoxic appearing, expresses understanding and agrees with plan of care at this time  In light of this patient would benefit from outpatient management  Chest wall pain: acute illness or injury  Amount and/or Complexity of Data Reviewed  Radiology: ordered and independent interpretation performed  Risk  OTC drugs  Prescription drug management  Disposition  Final diagnoses:   Viral URI with cough   Chest wall pain     Time reflects when diagnosis was documented in both MDM as applicable and the Disposition within this note     Time User Action Codes Description Comment    3/4/2023  2:43 AM espinoza Joshua Add [J06 9] Viral URI with cough     3/4/2023  2:47 AM espinoza Joshua Add [R07 89] Chest wall pain       ED Disposition     ED Disposition   Discharge    Condition   Stable    Date/Time   Sat Mar 4, 2023  2:43 AM    Comment   Urban Thakkar discharge to home/self care  Follow-up Information     Follow up With Specialties Details Why Contact Otilia Osorio DO Family Medicine   [facility] [address]    [address]  [place] [address]  [phone]            Discharge Medication List as of 3/4/2023  2:48 AM      START taking these medications    Details   guaiFENesin (ROBITUSSIN) 100 MG/5ML oral liquid Take 5-10 mL (100-200 mg total) by mouth every 4 (four) hours as needed for cough, Starting Sat 3/4/2023, Normal         CONTINUE these medications which have NOT CHANGED    Details   butalbital-acetaminophen-caffeine (FIORICET,ESGIC) -40 mg per tablet Take 1 tablet by mouth every 4 (four) hours as needed for headaches, Starting Thu 1/5/2023, Normal      CALCIUM-MAGNESIUM PO Take 3 tablets by mouth daily , Historical Med      Cholecalciferol (Vitamin D3) 125 MCG (5000 UT) CAPS Take 1 capsule (5,000 Units total) by mouth daily Over the counter, Starting Fri 3/26/2021, No Print      LUTEIN PO Take 1 capsule by mouth daily , Historical Med      traMADol (ULTRAM) 50 mg tablet Take 1 tablet (50 mg total) by mouth 2 (two) times a day as needed for moderate pain, Starting Thu 1/5/2023, Normal         STOP taking these medications       benzonatate (TESSALON PERLES) 100 mg capsule Comments:   Reason for Stopping:             No discharge procedures on file  PDMP Review       Value Time User    PDMP Reviewed  Yes 1/5/2023  9:34 AM Edward Ramey DO           ED Provider  Attending physically available and evaluated William Rodriguez  I managed the patient along with the ED Attending      Electronically Signed by         Roni Allison MD  03/04/23 8257

## 2023-03-04 NOTE — ED RE-EVALUATION NOTE
Patient called stated that she was not sent home with a refill for her [facility]  A prescription was sent to the pharmacy         Awilda Frank PA-C  03/04/23 101

## 2023-03-09 ENCOUNTER — TELEPHONE (OUTPATIENT)
Dept: FAMILY MEDICINE CLINIC | Facility: CLINIC | Age: 68
End: 2023-03-09

## 2023-03-09 NOTE — TELEPHONE ENCOUNTER
Patient has had Bronchitis since 2/1, she was taken by ambulance to the ER last Friday 3/3 because she believed she had cracked/broke a rib from coughing so much but she did not and they sent her home  She is taking Delsym and running a vaporizer in her room but nothing is helping  She is very congested, she is still coughing a lot and she is very fatigued  She was taking an antibiotic but had a bad reaction to that  Is there something maybe a steroid or something that you can send in for her that will help    Please advise

## 2023-03-10 ENCOUNTER — OFFICE VISIT (OUTPATIENT)
Dept: FAMILY MEDICINE CLINIC | Facility: CLINIC | Age: 68
End: 2023-03-10

## 2023-03-10 VITALS
RESPIRATION RATE: 18 BRPM | SYSTOLIC BLOOD PRESSURE: 120 MMHG | WEIGHT: 152 LBS | BODY MASS INDEX: 24.43 KG/M2 | HEIGHT: 66 IN | TEMPERATURE: 97.9 F | OXYGEN SATURATION: 99 % | DIASTOLIC BLOOD PRESSURE: 78 MMHG | HEART RATE: 79 BPM

## 2023-03-10 DIAGNOSIS — J40 BRONCHITIS: Primary | ICD-10-CM

## 2023-03-10 RX ORDER — PREDNISONE 10 MG/1
TABLET ORAL
Qty: 30 TABLET | Refills: 0 | Status: SHIPPED | OUTPATIENT
Start: 2023-03-10

## 2023-03-10 RX ORDER — ALBUTEROL SULFATE 90 UG/1
2 AEROSOL, METERED RESPIRATORY (INHALATION) EVERY 4 HOURS PRN
Qty: 18 G | Refills: 1 | Status: SHIPPED | OUTPATIENT
Start: 2023-03-10

## 2023-03-10 NOTE — PATIENT INSTRUCTIONS
How to Use a Metered-Dose Inhaler   WHAT YOU NEED TO KNOW:   What is a metered-dose inhaler? A metered-dose inhaler is a handheld device that gives you a dose of medicine as a mist  You breathe the medicine deep into your lungs to open your airways  How do I use an inhaler? Follow the instructions that come with your inhaler  Your medicine will work best if you use the inhaler correctly  The following steps will help you use your inhaler correctly:  Remove the cap  Check to make sure nothing is in the mouthpiece that could block the medicine from coming out  Shake the inhaler to mix the medicine  Hold the inhaler upright  Prime the inhaler as directed  Breathe out fully  Do not breathe out into the mouthpiece  Place the mouthpiece between your lips  Close your lips tightly around the mouthpiece to form a seal and prevent a medicine leak  Breathe in slowly through your mouth as you press down on the canister  Breathe in for 5 seconds  Hold your breath for at least 5 seconds  This helps the medicine get deep into your lungs  Remove the mouthpiece from your mouth  Breathe out slowly  Repeat puffs of medicine as directed by your healthcare provider  Wait 1 minute between puffs  Rinse your mouth with water or saline  Do not swallow the water or saline  How do I care for my inhaler? Put the cap back on the inhaler after each use to keep the mouthpiece clean  Clean the inhaler at least 1 time each week, or as directed  Read and follow the cleaning instructions that come with your inhaler  Call your local emergency number (911 in the [address] [place] [address],[address]), or have someone call if:   Your lips or nails turn blue or gray  When should I seek immediate care? You cough up blood  The skin between your ribs or around your neck pulls in with every breath  You feel short of breath, even after you use your inhaler  When should I call my doctor?    You feel the medicine spray on your tongue or throat, rather than going into your lungs  You have to take more puffs from the inhaler than directed, in order to get relief  You run out of medicine before your next refill is due  You feel like your medicine is not making your symptoms better  You have questions or concerns about your condition or care  CARE AGREEMENT:   You have the right to help plan your care  Learn about your health condition and how it may be treated  Discuss treatment options with your healthcare providers to decide what care you want to receive  You always have the right to refuse treatment  The above information is an  only  It is not intended as medical advice for individual conditions or treatments  Talk to your doctor, nurse or pharmacist before following any medical regimen to see if it is safe and effective for you  © Copyright [facility] 2022 Information is for End User's use only and may not be sold, redistributed or otherwise used for commercial purposes

## 2023-03-10 NOTE — PROGRESS NOTES
Assessment/Plan:   Crow Blackburn was seen today for cough  Diagnoses and all orders for this visit:    Bronchitis  -     predniSONE 10 mg tablet; 4/day for 3 days, then 3//day for 3 days, then 2/day for 3 days then 1 daily for 3 days  -     albuterol (PROVENTIL HFA,VENTOLIN HFA) 90 mcg/act inhaler; Inhale 2 puffs every 4 (four) hours as needed for wheezing or shortness of breath  viral likely  Now 6 weeks  Start oral steroid and prescription albuterol as needed   If persists after will transition to steroid inhaler   Hold advil while on prednisone   Tylenol, robitussin and bendryl okay to use     Patient Instructions   How to Use a Metered-Dose Inhaler   WHAT YOU NEED TO KNOW:   What is a metered-dose inhaler? A metered-dose inhaler is a handheld device that gives you a dose of medicine as a mist  You breathe the medicine deep into your lungs to open your airways  How do I use an inhaler? Follow the instructions that come with your inhaler  Your medicine will work best if you use the inhaler correctly  The following steps will help you use your inhaler correctly:  Remove the cap  Check to make sure nothing is in the mouthpiece that could block the medicine from coming out  Shake the inhaler to mix the medicine  Hold the inhaler upright  Prime the inhaler as directed  Breathe out fully  Do not breathe out into the mouthpiece  Place the mouthpiece between your lips  Close your lips tightly around the mouthpiece to form a seal and prevent a medicine leak  Breathe in slowly through your mouth as you press down on the canister  Breathe in for 5 seconds  Hold your breath for at least 5 seconds  This helps the medicine get deep into your lungs  Remove the mouthpiece from your mouth  Breathe out slowly  Repeat puffs of medicine as directed by your healthcare provider  Wait 1 minute between puffs  Rinse your mouth with water or saline  Do not swallow the water or saline      How do I care for my inhaler? Put the cap back on the inhaler after each use to keep the mouthpiece clean  Clean the inhaler at least 1 time each week, or as directed  Read and follow the cleaning instructions that come with your inhaler  Call your local emergency number (911 in the [address]), or have someone call if:   Your lips or nails turn blue or gray  When should I seek immediate care? You cough up blood  The skin between your ribs or around your neck pulls in with every breath  You feel short of breath, even after you use your inhaler  When should I call my doctor? You feel the medicine spray on your tongue or throat, rather than going into your lungs  You have to take more puffs from the inhaler than directed, in order to get relief  You run out of medicine before your next refill is due  You feel like your medicine is not making your symptoms better  You have questions or concerns about your condition or care  CARE AGREEMENT:   You have the right to help plan your care  Learn about your health condition and how it may be treated  Discuss treatment options with your healthcare providers to decide what care you want to receive  You always have the right to refuse treatment  The above information is an  only  It is not intended as medical advice for individual conditions or treatments  Talk to your doctor, nurse or pharmacist before following any medical regimen to see if it is safe and effective for you  © Copyright [employer] 2022 Information is for End User's use only and may not be sold, redistributed or otherwise used for commercial purposes  No follow-ups on file    Subjective:    CATY Healy is a 75 y o  female who presents with:  Chief Complaint    Cough       HPI     Cough     Additional comments: Has been sick for 6 weeks still coughing Congestion, back pain           Last edited by Rickey Saba on 3/10/2023  1:36 PM         ---Above per clinical staff & reviewed  ---        Today:  Coughing x 6 weeks   Congestion   Little better  coughing so hard thought she broke a rib     2/21/23 Augmentin -rash and hives diarrhea   Also was on tessalon perles but stopping them did not help   Stopped after a week   Jhon  advil PM  Tramadol Friday 2 tablets three times a day constipation   Taking herbals     The following portions of the patient's history were reviewed and updated as appropriate: allergies, current medications, past family history, past medical history, past social history, past surgical history and problem list   Review of Systems  ROS:  all others negative - no chest pain, SOB, normal urine and bowels  no GERD  Not sleeping well  mood good     Objective:    /78   Pulse 79   Temp 97 9 °F (36 6 °C)   Resp 18   Ht 5' 6" (1 676 m)   Wt 68 9 kg (152 lb)   SpO2 99%   BMI 24 53 kg/m²   Wt Readings from Last 3 Encounters:   03/10/23 68 9 kg (152 lb)   02/21/23 69 9 kg (154 lb 3 2 oz)   01/05/23 68 9 kg (152 lb)     BP Readings from Last 3 Encounters:   03/10/23 120/78   03/03/23 155/74   02/21/23 128/78       Current Medications:  Current Outpatient Medications   Medication Sig Dispense Refill   • albuterol (PROVENTIL HFA,VENTOLIN HFA) 90 mcg/act inhaler Inhale 2 puffs every 4 (four) hours as needed for wheezing or shortness of breath 18 g 1   • benzonatate (TESSALON PERLES) 100 mg capsule Take 1 capsule (100 mg total) by mouth 3 (three) times a day as needed for cough for up to 10 days 30 capsule 0   • butalbital-acetaminophen-caffeine (FIORICET,ESGIC) -40 mg per tablet Take 1 tablet by mouth every 4 (four) hours as needed for headaches 30 tablet 0   • CALCIUM-MAGNESIUM PO Take 3 tablets by mouth daily      • Cholecalciferol (Vitamin D3) 125 MCG (5000 UT) CAPS Take 1 capsule (5,000 Units total) by mouth daily Over the counter     • guaiFENesin (ROBITUSSIN) 100 MG/5ML oral liquid Take 5-10 mL (100-200 mg total) by mouth every 4 (four) hours as needed for cough 60 mL 0   • LUTEIN PO Take 1 capsule by mouth daily      • predniSONE 10 mg tablet 4/day for 3 days, then 3//day for 3 days, then 2/day for 3 days then 1 daily for 3 days  30 tablet 0   • traMADol (ULTRAM) 50 mg tablet Take 1 tablet (50 mg total) by mouth 2 (two) times a day as needed for moderate pain 30 tablet 0     No current facility-administered medications for this visit  Physical Exam   Constitutional: she appears well-developed and well-nourished  HENT: Head: Normocephalic  Right Ear: External ear normal  Tympanic membrane normal    Left Ear: External ear normal  Tympanic membrane normal    Nose: Nose normal  No mucosal edema, No rhinorrhea  Right sinus exhibits no maxillary sinus tenderness  Left sinus exhibits no maxillary sinus tenderness  Mouth/Throat: Oropharynx is clear and moist    Eyes: Normal conjunctiva  No erythema  No discharge  Neck: No pain on exam  Neck supple  Cardiovascular: Normal rate, regular rhythm and normal heart sounds  Pulmonary/Chest: Effort normal and breath sounds normal   Frequent dry cough  Deep breath makes it worse  No wheezes, rales, rhonchi  Abdominal: Soft  Bowel sounds are normal  There is no tenderness  Lymphadenopathy: she has no cervical adenopathy  Neurological: she is alert and oriented to person, place, and time  Skin: Skin is warm and dry  Psychiatric: she has a normal mood and affect   her behavior is normal

## 2023-05-03 PROBLEM — N60.91 ATYPICAL DUCTAL HYPERPLASIA OF RIGHT BREAST: Status: ACTIVE | Noted: 2023-05-03

## 2023-05-04 ENCOUNTER — OFFICE VISIT (OUTPATIENT)
Dept: FAMILY MEDICINE CLINIC | Facility: CLINIC | Age: 68
End: 2023-05-04

## 2023-05-04 ENCOUNTER — APPOINTMENT (OUTPATIENT)
Dept: LAB | Facility: CLINIC | Age: 68
End: 2023-05-04

## 2023-05-04 VITALS
OXYGEN SATURATION: 98 % | HEIGHT: 66 IN | HEART RATE: 54 BPM | BODY MASS INDEX: 24.65 KG/M2 | RESPIRATION RATE: 20 BRPM | SYSTOLIC BLOOD PRESSURE: 142 MMHG | DIASTOLIC BLOOD PRESSURE: 84 MMHG | WEIGHT: 153.4 LBS

## 2023-05-04 DIAGNOSIS — Z79.899 LONG-TERM USE OF HIGH-RISK MEDICATION: ICD-10-CM

## 2023-05-04 DIAGNOSIS — M51.36 DDD (DEGENERATIVE DISC DISEASE), LUMBAR: ICD-10-CM

## 2023-05-04 DIAGNOSIS — N60.91 ATYPICAL DUCTAL HYPERPLASIA OF RIGHT BREAST: ICD-10-CM

## 2023-05-04 DIAGNOSIS — M51.16 INTERVERTEBRAL DISC DISORDER WITH RADICULOPATHY OF LUMBAR REGION: ICD-10-CM

## 2023-05-04 DIAGNOSIS — M54.16 LUMBAR RADICULOPATHY: ICD-10-CM

## 2023-05-04 DIAGNOSIS — E55.9 VITAMIN D DEFICIENCY: ICD-10-CM

## 2023-05-04 DIAGNOSIS — D37.3 LOW GRADE MUCINOUS NEOPLASM OF APPENDIX: ICD-10-CM

## 2023-05-04 DIAGNOSIS — Z12.11 SCREENING FOR MALIGNANT NEOPLASM OF COLON: ICD-10-CM

## 2023-05-04 DIAGNOSIS — M81.0 OSTEOPOROSIS OF LUMBAR SPINE: ICD-10-CM

## 2023-05-04 DIAGNOSIS — Z00.00 ENCOUNTER FOR MEDICARE ANNUAL WELLNESS EXAM: Primary | ICD-10-CM

## 2023-05-04 LAB
25(OH)D3 SERPL-MCNC: 30.6 NG/ML (ref 30–100)
ALBUMIN SERPL BCP-MCNC: 4.2 G/DL (ref 3.5–5)
ALP SERPL-CCNC: 57 U/L (ref 34–104)
ALT SERPL W P-5'-P-CCNC: 12 U/L (ref 7–52)
ANION GAP SERPL CALCULATED.3IONS-SCNC: 5 MMOL/L (ref 4–13)
AST SERPL W P-5'-P-CCNC: 16 U/L (ref 13–39)
BASOPHILS # BLD AUTO: 0.08 THOUSANDS/ÂΜL (ref 0–0.1)
BASOPHILS NFR BLD AUTO: 1 % (ref 0–1)
BILIRUB SERPL-MCNC: 0.53 MG/DL (ref 0.2–1)
BUN SERPL-MCNC: 14 MG/DL (ref 5–25)
CALCIUM SERPL-MCNC: 9.6 MG/DL (ref 8.4–10.2)
CHLORIDE SERPL-SCNC: 104 MMOL/L (ref 96–108)
CO2 SERPL-SCNC: 28 MMOL/L (ref 21–32)
CREAT SERPL-MCNC: 0.62 MG/DL (ref 0.6–1.3)
EOSINOPHIL # BLD AUTO: 0.04 THOUSAND/ÂΜL (ref 0–0.61)
EOSINOPHIL NFR BLD AUTO: 1 % (ref 0–6)
ERYTHROCYTE [DISTWIDTH] IN BLOOD BY AUTOMATED COUNT: 14.3 % (ref 11.6–15.1)
GFR SERPL CREATININE-BSD FRML MDRD: 92 ML/MIN/1.73SQ M
GLUCOSE SERPL-MCNC: 88 MG/DL (ref 65–140)
HCT VFR BLD AUTO: 46.5 % (ref 34.8–46.1)
HGB BLD-MCNC: 15.3 G/DL (ref 11.5–15.4)
IMM GRANULOCYTES # BLD AUTO: 0.02 THOUSAND/UL (ref 0–0.2)
IMM GRANULOCYTES NFR BLD AUTO: 0 % (ref 0–2)
LYMPHOCYTES # BLD AUTO: 2.12 THOUSANDS/ÂΜL (ref 0.6–4.47)
LYMPHOCYTES NFR BLD AUTO: 30 % (ref 14–44)
MCH RBC QN AUTO: 31.4 PG (ref 26.8–34.3)
MCHC RBC AUTO-ENTMCNC: 32.9 G/DL (ref 31.4–37.4)
MCV RBC AUTO: 95 FL (ref 82–98)
MONOCYTES # BLD AUTO: 0.48 THOUSAND/ÂΜL (ref 0.17–1.22)
MONOCYTES NFR BLD AUTO: 7 % (ref 4–12)
NEUTROPHILS # BLD AUTO: 4.43 THOUSANDS/ÂΜL (ref 1.85–7.62)
NEUTS SEG NFR BLD AUTO: 61 % (ref 43–75)
NRBC BLD AUTO-RTO: 0 /100 WBCS
PLATELET # BLD AUTO: 253 THOUSANDS/UL (ref 149–390)
PMV BLD AUTO: 10.5 FL (ref 8.9–12.7)
POTASSIUM SERPL-SCNC: 4.2 MMOL/L (ref 3.5–5.3)
PROT SERPL-MCNC: 7.1 G/DL (ref 6.4–8.4)
RBC # BLD AUTO: 4.88 MILLION/UL (ref 3.81–5.12)
SODIUM SERPL-SCNC: 137 MMOL/L (ref 135–147)
WBC # BLD AUTO: 7.17 THOUSAND/UL (ref 4.31–10.16)

## 2023-05-04 RX ORDER — CHLORAL HYDRATE 500 MG
1000 CAPSULE ORAL DAILY
COMMUNITY

## 2023-05-04 NOTE — PATIENT INSTRUCTIONS
Medicare Preventive Visit Patient Instructions  Thank you for completing your Welcome to Medicare Visit or Medicare Annual Wellness Visit today  Your next wellness visit will be due in one year (5/4/2024)  The screening/preventive services that you may require over the next 5-10 years are detailed below  Some tests may not apply to you based off risk factors and/or age  Screening tests ordered at today's visit but not completed yet may show as past due  Also, please note that scanned in results may not display below  Preventive Screenings:  Service Recommendations Previous Testing/Comments   Colorectal Cancer Screening  * Colonoscopy    * Fecal Occult Blood Test (FOBT)/Fecal Immunochemical Test (FIT)  * Fecal DNA/Cologuard Test  * Flexible Sigmoidoscopy Age: 39-70 years old   Colonoscopy: every 10 years (may be performed more frequently if at higher risk)  OR  FOBT/FIT: every 1 year  OR  Cologuard: every 3 years  OR  Sigmoidoscopy: every 5 years  Screening may be recommended earlier than age 39 if at higher risk for colorectal cancer  Also, an individualized decision between you and your healthcare provider will decide whether screening between the ages of 74-80 would be appropriate  Colonoscopy: 05/04/2022  FOBT/FIT: Not on file  Cologuard: Not on file  Sigmoidoscopy: Not on file    Screening Current  History Colorectal Cancer     Breast Cancer Screening Age: 36 years old  Frequency: every 1-2 years  Not required if history of left and right mastectomy Mammogram: Not on file        Cervical Cancer Screening Between the ages of 21-29, pap smear recommended once every 3 years  Between the ages of 33-67, can perform pap smear with HPV co-testing every 5 years     Recommendations may differ for women with a history of total hysterectomy, cervical cancer, or abnormal pap smears in past  Pap Smear: 12/17/2019    Screening Not Indicated   Hepatitis C Screening Once for adults born between 1945 and 1965  More frequently in patients at high risk for Hepatitis C Hep C Antibody: 09/30/2019    Screening Current   Diabetes Screening 1-2 times per year if you're at risk for diabetes or have pre-diabetes Fasting glucose: 106 mg/dL (10/23/2020)  A1C: 5 7 (9/30/2019)      Cholesterol Screening Once every 5 years if you don't have a lipid disorder  May order more often based on risk factors  Lipid panel: Not on file          Other Preventive Screenings Covered by Medicare:  1  Abdominal Aortic Aneurysm (AAA) Screening: covered once if your at risk  You're considered to be at risk if you have a family history of AAA  2  Lung Cancer Screening: covers low dose CT scan once per year if you meet all of the following conditions: (1) Age 50-69; (2) No signs or symptoms of lung cancer; (3) Current smoker or have quit smoking within the last 15 years; (4) You have a tobacco smoking history of at least 20 pack years (packs per day multiplied by number of years you smoked); (5) You get a written order from a healthcare provider  3  Glaucoma Screening: covered annually if you're considered high risk: (1) You have diabetes OR (2) Family history of glaucoma OR (3)  aged 48 and older OR (3)  American aged 72 and older  3  Osteoporosis Screening: covered every 2 years if you meet one of the following conditions: (1) You're estrogen deficient and at risk for osteoporosis based off medical history and other findings; (2) Have a vertebral abnormality; (3) On glucocorticoid therapy for more than 3 months; (4) Have primary hyperparathyroidism; (5) On osteoporosis medications and need to assess response to drug therapy  · Last bone density test (DXA Scan): Not on file  5  HIV Screening: covered annually if you're between the age of 12-76  Also covered annually if you are younger than 13 and older than 72 with risk factors for HIV infection   For pregnant patients, it is covered up to 3 times per pregnancy  Immunizations:  Immunization Recommendations   Influenza Vaccine Annual influenza vaccination during flu season is recommended for all persons aged >= 6 months who do not have contraindications   Pneumococcal Vaccine   * Pneumococcal conjugate vaccine = PCV13 (Prevnar 13), PCV15 (Vaxneuvance), PCV20 (Prevnar 20)  * Pneumococcal polysaccharide vaccine = PPSV23 (Pneumovax) Adults 25-60 years old: 1-3 doses may be recommended based on certain risk factors  Adults 72 years old: 1-2 doses may be recommended based off what pneumonia vaccine you previously received   Hepatitis B Vaccine 3 dose series if at intermediate or high risk (ex: diabetes, end stage renal disease, liver disease)   Tetanus (Td) Vaccine - COST NOT COVERED BY MEDICARE PART B Following completion of primary series, a booster dose should be given every 10 years to maintain immunity against tetanus  Td may also be given as tetanus wound prophylaxis  Tdap Vaccine - COST NOT COVERED BY MEDICARE PART B Recommended at least once for all adults  For pregnant patients, recommended with each pregnancy  Shingles Vaccine (Shingrix) - COST NOT COVERED BY MEDICARE PART B  2 shot series recommended in those aged 48 and above     Health Maintenance Due:      Topic Date Due    DXA SCAN  06/23/2023 (Originally 1955)    Breast Cancer Screening: Mammogram  01/05/2024 (Originally 9/20/1994)    Colorectal Cancer Screening  05/03/2027    Hepatitis C Screening  Completed     Immunizations Due:  There are no preventive care reminders to display for this patient  Advance Directives   What are advance directives? Advance directives are legal documents that state your wishes and plans for medical care  These plans are made ahead of time in case you lose your ability to make decisions for yourself  Advance directives can apply to any medical decision, such as the treatments you want, and if you want to donate organs     What are the types of advance directives? There are many types of advance directives, and each state has rules about how to use them  You may choose a combination of any of the following:  · Living will: This is a written record of the treatment you want  You can also choose which treatments you do not want, which to limit, and which to stop at a certain time  This includes surgery, medicine, IV fluid, and tube feedings  · Durable power of  for healthcare [facility]): This is a written record that states who you want to make healthcare choices for you when you are unable to make them for yourself  This person, called a proxy, is usually a family member or a friend  You may choose more than 1 proxy  · Do not resuscitate (DNR) order:  A DNR order is used in case your heart stops beating or you stop breathing  It is a request not to have certain forms of treatment, such as CPR  A DNR order may be included in other types of advance directives  · Medical directive: This covers the care that you want if you are in a coma, near death, or unable to make decisions for yourself  You can list the treatments you want for each condition  Treatment may include pain medicine, surgery, blood transfusions, dialysis, IV or tube feedings, and a ventilator (breathing machine)  · Values history: This document has questions about your views, beliefs, and how you feel and think about life  This information can help others choose the care that you would choose  Why are advance directives important? An advance directive helps you control your care  Although spoken wishes may be used, it is better to have your wishes written down  Spoken wishes can be misunderstood, or not followed  Treatments may be given even if you do not want them  An advance directive may make it easier for your family to make difficult choices about your care  Alcohol Use and Your Health    Drinking too much can harm your health    Excessive alcohol use leads to about 88,000 death "in the [place] each year, and shortens the life of those who diet by almost 30 years  Further, excessive drinking cost the economy $249 billion in 2010  Most excessive drinkers are not alcohol dependent  Excessive alcohol use has immediate effects that increase the risk of many harmful health conditions  These are most often the result of binge drinking  Over time, excessive alcohol use can lead to the development of chronic diseases and other series health problems  What is considered a \"drink\"? Excessive alcohol use includes:  · Binge Drinking: For women, 4 or more drinks consumed on one occasion  For men, 5 or more drinks consumed on one occasion  · Heavy Drinking: For women, 8 or more drinks per week  For men, 15 or more drinks per week  · Any alcohol used by pregnant women  · Any alcohol used by those under the age of 21 years    If you choose to drink, do so in moderation:  · Do not drink at all if you are under the age of 24, or if you are or may be pregnant, or have health problems that could be made worse by drinking    · For women, up to 1 drink per day  · For men, up to 2 drinks a day    No one should begin drinking or drink more frequently based on potential health benefits    Short-Term Health Risks:  · Injuries: motor vehicle crashes, falls, drownings, burns  · Violence: homicide, suicide, sexual assault, intimate partner violence  · Alcohol poisoning  · Reproductive health: risky sexual behaviors, unintended prengnacy, sexually transmitted diseases, miscarriage, stillbirth, fetal alcohol syndrome    Long-Term Health Risks:  · Chronic diseases: high blood pressure, heart disease, stroke, liver disease, digestive problems  · Cancers: breast, mouth and throat, liver, colon  · Learning and memory problems: dementia, poor school performance  · Mental health: depression, anxiety, insomnia  · Social problems: lost productivity, family problems, unemployment  · Alcohol dependence    For " support and more information:  · Substance Abuse and [place] [address] [place] [phone]  Web Address: https://[employer]/    · Alcoholics Anonymous        Web Address: http://www rhodes info/    https://www cdc gov/alcohol/fact-sheets/alcohol-use htm  Narcotic (Opioid) Safety    Use narcotics safely:  · Take prescribed narcotics exactly as directed  · Do not give narcotics to others or take narcotics that belong to someone else  · Do not mix narcotics without medicines or alcohol  · Do not drive or operate heavy machinery after you take the narcotic  · Monitor for side effects and notify your healthcare provider if you experienced side effects such as nausea, sleepiness, itching, or trouble thinking clearly  Manage constipation:    Constipation is the most common side effect of narcotic medicine  Constipation is when you have hard, dry bowel movements, or you go longer than usual between bowel movements  Tell your healthcare provider about all changes in your bowel movements while you are taking narcotics  He or she may recommend laxative medicine to help you have a bowel movement  He or she may also change the kind of narcotic you are taking, or change when you take it  The following are more ways you can prevent or relieve constipation:    · Drink liquids as directed  You may need to drink extra liquids to help soften and move your bowels  Ask how much liquid to drink each day and which liquids are best for you  · Eat high-fiber foods  This may help decrease constipation by adding bulk to your bowel movements  High-fiber foods include fruits, vegetables, whole-grain breads and cereals, and beans  Your healthcare provider or dietitian can help you create a high-fiber meal plan  Your provider may also recommend a fiber supplement if you cannot get enough fiber from food  · Exercise regularly  Regular physical activity can help stimulate your intestines   Walking is a good exercise to prevent or relieve constipation  Ask which exercises are best for you  · Schedule a time each day to have a bowel movement  This may help train your body to have regular bowel movements  Bend forward while you are on the toilet to help move the bowel movement out  Sit on the toilet for at least 10 minutes, even if you do not have a bowel movement  Store narcotics safely:   · Store narcotics where others cannot easily get them  Keep them in a locked cabinet or secure area  Do not  keep them in a purse or other bag you carry with you  A person may be looking for something else and find the narcotics  · Make sure narcotics are stored out of the reach of children  A child can easily overdose on narcotics  Narcotics may look like candy to a small child  The best way to dispose of narcotics: The laws vary by country and area  In the [place], the best way is to return the narcotics through a take-back program  This program is offered by the [employer])  The following are options for using the program:  · Take the narcotics to a MANNY collection site  The site is often a law enforcement center  Call your local law enforcement center for scheduled take-back days in your area  You will be given information on where to go if the collection site is in a different location  · Take the narcotics to an approved pharmacy or hospital   A pharmacy or hospital may be set up as a collection site  You will need to ask if it is a MANNY collection site if you were not directed there  A pharmacy or doctor's office may not be able to take back narcotics unless it is a MANNY site  · Use a mail-back system  This means you are given containers to put the narcotics into  You will then mail them in the containers  · Use a take-back drop box  This is a place to leave the narcotics at any time  People and animals will not be able to get into the box   Your local law enforcement agency can tell you where to find a drop box in your area  Other ways to manage pain:   · Ask your healthcare provider about non-narcotic medicines to control pain  Nonprescription medicines include NSAIDs (such as ibuprofen) and acetaminophen  Prescription medicines include muscle relaxers, antidepressants, and steroids  · Pain may be managed without any medicines  Some ways to relieve pain include massage, aromatherapy, or meditation  Physical or occupational therapy may also help  For more information:   · Drug Enforcement Administration  [address] [facility] [address]  Phone: 3- 476 - [phone]  Web Address: [facility]/drug_disposal/    · [address]  and Drug Administration  [place] , [address] [facility]  Phone: 0- 306 - [phone]  Web Address: http://[employer]/     © Copyright [employer] 2018 Information is for End User's use only and may not be sold, redistributed or otherwise used for commercial purposes  All illustrations and images included in CareNotes® are the copyrighted property of [employer]  or [address] [place] [address] care:  Maximize your health and quality of life by:   · Increasing your level of function and activity  · Decreasing the negative effects of pain on your life  · Minimizing the risks and side effects of medications and ensuring safe use of opioid medication     Ways for you to help meet your goals:  Maintain a healthy lifestyle  This includes proper nutrition, regular physical activity as able, try for 8 hours of sleep per night, use stress reduction strategies, avoid triggers  Risks and side effects of opioid use:  Prescription opioids carry serious risks of addiction and  overdose, especially with prolonged use  An opioid overdose,  often marked by slowed breathing, can cause sudden death   The  use of prescription opioids can have a number of side effects as  well, even when taken as directed:  · Tolerance--meaning you might need to take more of a medication for the same pain relief  · Physical dependence--meaning you have symptoms of withdrawal when a medication is stopped  · Increased sensitivity to pain  · Constipation  · Nausea, vomiting, dry mouth  · Sleepiness and dizziness   · Confusion  · Depression  · Low levels of testosterone that can result in lower sex drive, energy, and strength  · Itching and sweating    If you are prescribed opioids for pain:  · Never take opioids in greater amounts or more often than prescribed  · Help prevent misuse and abuse  - Never sell or share prescription opioids         - Never use another persons prescription opioids  · Store prescription opioids in a secure place and out of reach of others (this may include visitors, children, friends, and family)  · Safely dispose of unused prescription opioids: Find your community drug take-back program or your pharmacy mail-back program, or flush them down the toilet, following guidance from the Food and Drug Administration (www fda gov/Drugs/ResourcesForYou)  · Visit www cdc gov/drugoverdose to learn about the risks of opioid abuse and overdose  · If you believe you may be struggling with addiction, tell your health care provider and ask for guidance or call [address] at [phone]

## 2023-05-04 NOTE — PROGRESS NOTES
Assessment and Plan:     Problem List Items Addressed This Visit        Unprioritized    Vitamin D deficiency    Relevant Orders    CBC and differential (Completed)    Comprehensive metabolic panel (Completed)    Vitamin D 25 hydroxy (Completed)    Screening for malignant neoplasm of colon    Osteoporosis of lumbar spine    Low grade mucinous neoplasm of appendix    Intervertebral disc disorder with radiculopathy of lumbar region    DDD (degenerative disc disease), lumbar    Relevant Orders    [facility] All Prescribed Meds and Special Instructions (Completed)    Amphetamines, Methamphetamines (Completed)    Butalbital (Completed)    Phenobarbital (Completed)    Secobarbital (Completed)    Alprazolam (Completed)    Clonazepam (Completed)    Diazepam, Temazepam, Oxazepam (Completed)    Lorazepam (Completed)    Gabapentin (Completed)    Pregabalin (Completed)    Cocaine (Completed)    Heroin (Completed)    Buprenorphine (Completed)    Levorphanol (Completed)    Meperidine (Completed)    Naltrexone (Completed)    Fentanyl (Completed)    Methadone (Completed)    Oxycodone, Oxymorphone (Completed)    Tapentadol (Completed)    THC (Completed)    Tramadol (Completed)    Codeine, Hydrocodone, Hydropmorphone, Morphine (Completed)    Bath Salts (Completed)    Ethyl Glucuronide/Ethyl Sulfate (Completed)    Kratom (Completed)    Spice (Completed)    Methylphenidate (Completed)    Phentermine (Completed)    Validity Oxidant (Completed)    Validity Creatinine (Completed)    Validity pH (Completed)    Validity Specific (Completed)    Atypical ductal hyperplasia of right breast   Other Visit Diagnoses     Encounter for Medicare annual wellness exam    -  Primary    Long-term use of high-risk medication        Relevant Orders    CBC and differential (Completed)    Comprehensive metabolic panel (Completed)    Vitamin D 25 hydroxy (Completed)    [facility] All Prescribed Meds and Special Instructions (Completed)    Amphetamines, Methamphetamines (Completed)    Butalbital (Completed)    Phenobarbital (Completed)    Secobarbital (Completed)    Alprazolam (Completed)    Clonazepam (Completed)    Diazepam, Temazepam, Oxazepam (Completed)    Lorazepam (Completed)    Gabapentin (Completed)    Pregabalin (Completed)    Cocaine (Completed)    Heroin (Completed)    Buprenorphine (Completed)    Levorphanol (Completed)    Meperidine (Completed)    Naltrexone (Completed)    Fentanyl (Completed)    Methadone (Completed)    Oxycodone, Oxymorphone (Completed)    Tapentadol (Completed)    THC (Completed)    Tramadol (Completed)    Codeine, Hydrocodone, Hydropmorphone, Morphine (Completed)    Bath Salts (Completed)    Ethyl Glucuronide/Ethyl Sulfate (Completed)    Kratom (Completed)    Spice (Completed)    Methylphenidate (Completed)    Phentermine (Completed)    Validity Oxidant (Completed)    Validity Creatinine (Completed)    Validity pH (Completed)    Validity Specific (Completed)    Lumbar radiculopathy        Relevant Orders    MRI lumbar spine wo contrast    [facility] Prescribed Meds and Special Instructions (Completed)    Amphetamines, Methamphetamines (Completed)    Butalbital (Completed)    Phenobarbital (Completed)    Secobarbital (Completed)    Alprazolam (Completed)    Clonazepam (Completed)    Diazepam, Temazepam, Oxazepam (Completed)    Lorazepam (Completed)    Gabapentin (Completed)    Pregabalin (Completed)    Cocaine (Completed)    Heroin (Completed)    Buprenorphine (Completed)    Levorphanol (Completed)    Meperidine (Completed)    Naltrexone (Completed)    Fentanyl (Completed)    Methadone (Completed)    Oxycodone, Oxymorphone (Completed)    Tapentadol (Completed)    THC (Completed)    Tramadol (Completed)    Codeine, Hydrocodone, Hydropmorphone, Morphine (Completed)    Bath Salts (Completed)    Ethyl Glucuronide/Ethyl Sulfate (Completed)    Kratom (Completed)    Spice (Completed)    Methylphenidate (Completed)    Phentermine (Completed)    Validity Oxidant (Completed)    Validity Creatinine (Completed)    Validity pH (Completed)    Validity Specific (Completed)          Depression Screening and Follow-up Plan: Patient was screened for depression during today's encounter  They screened negative with a PHQ-2 score of 0  Preventive health issues were discussed with patient, and age appropriate screening tests were ordered as noted in patient's After Visit Summary  Personalized health advice and appropriate referrals for health education or preventive services given if needed, as noted in patient's After Visit Summary       History of Present Illness:     Patient presents for a Medicare Wellness Visit    HPI   Patient Care Team:  Kristen Amador DO as PCP - General (Family Medicine)     Review of Systems:     Review of Systems     Problem List:     Patient Active Problem List   Diagnosis    Intervertebral disc disorder with radiculopathy of lumbar region    Osteoporosis of lumbar spine    DDD (degenerative disc disease), lumbar    Fibromyalgia    Generalized convulsive epilepsy ([address] )    Kyphoscoliosis    Migraine without aura    Varicose veins    Vitamin D deficiency    [facility]ative [facility] [address] [facility]    Screening for malignant neoplasm of colon    Proctosigmoiditis    Low grade mucinous neoplasm of appendix    Sacroiliitis ([facility] [address] [facility]    Atypical ductal hyperplasia of right breast      Past Medical and Surgical History:     Past Medical History:   Diagnosis Date    Atypical ductal hyperplasia of right breast     Feb 2010 (David Guevara/ZAKIA)     Decreased libido     Disorder of bone and cartilage     Resolved 5/29/2015     Dysplasia of cervix     s/p cone biopsy - 2002    Epilepsy ([address] )     has not had any episodes in 20 years    Fibromyalgia     1988 no specialists, no meds    Fibromyalgia, primary     Hypothyroidism, unspecified     Resolved 5/29/2015     Menopause     Sept 2008     Osteoarthrosis of hand     Left    Osteoporosis     Resolved 2015     Seizure grand mal ([address] )     Seizures [facility] [facility]     last seizure over twenty years ago    Ulcerative colitis ([facility] [address] [facility]     last c-scope 8 yrs ago wnl, treated with suppositories in the past- not active x 8-10 yrs    Uterine fibroid     Vitamin D deficiency, unspecified     Resolved 2015      Past Surgical History:   Procedure Laterality Date    BREAST SURGERY Right 2010    JANKI LAND  [facility] [place] , left lumpectomies [facility] ~ [address]      COLONOSCOPY      ID LAPAROSCOPY COLECTOMY PARTIAL W/ANASTOMOSIS N/A 10/30/2020    Procedure: RESECTION COLON RIGHT LAPAROSCOPIC/ Cecoappendectomy;  Surgeon: Edy Pittman MD;  [facility];  Service: Colorectal    UTERINE FIBROID SURGERY        Family History:     Family History   Problem Relation Age of Onset    Hypertension Father     Heart disease Father     Heart attack Father     Fibromyalgia Mother     Osteoporosis Mother     Colon cancer Maternal Grandfather     Colon cancer Cousin       Social History:     Social History     Socioeconomic History    Marital status: /Civil Union     Spouse name: None    Number of children: 3    Years of education: None    Highest education level: None   Occupational History    Occupation: Retired    Tobacco Use    Smoking status: Former     Packs/day: 0 25     Years: 4 00     Pack years: 1 00     Types: Cigarettes     Start date: 3/26/1961     Quit date: 1970     Years since quittin 6    Smokeless tobacco: Never   Vaping Use    Vaping Use: Former    Quit date: 2/15/2023    Substances: THC   Substance and Sexual Activity    Alcohol use:  Yes     Alcohol/week: 10 0 standard drinks     Types: 10 Glasses of wine per week    Drug use: Not Currently     Frequency: 7 0 times per week     Types: Marijuana    Sexual activity: Not Currently   Other Topics Concern    None   Social History Narrative    None     Social Determinants of Health Financial Resource Strain: Low Risk     Difficulty of Paying Living Expenses: Not hard at all   Food Insecurity: No Food Insecurity    Worried About Running Out of Food in the Last Year: Never true    Joshua of Food in the Last Year: Never true   Transportation Needs: No Transportation Needs    Lack of Transportation (Medical): No    Lack of Transportation (Non-Medical): No   Physical Activity: Not on file   Stress: Not on file   Social Connections: Not on file   Intimate Partner Violence: Not on file   Housing Stability: Low Risk     Unable to Pay for Housing in the Last Year: No    Number of Places Lived in the Last Year: 1    Unstable Housing in the Last Year: No      Medications and Allergies:     Current Outpatient Medications   Medication Sig Dispense Refill    butalbital-acetaminophen-caffeine (FIORICET,ESGIC) -40 mg per tablet Take 1 tablet by mouth every 4 (four) hours as needed for headaches 30 tablet 0    CALCIUM-MAGNESIUM PO Take 3 tablets by mouth daily 1000-500 mg per tablet      Cholecalciferol (Vitamin D3) 125 MCG (5000 UT) CAPS Take 1 capsule (5,000 Units total) by mouth daily Over the counter      LUTEIN PO Take 1 capsule by mouth daily       Omega-3 Fatty Acids (fish oil) 1,000 mg Take 1,000 mg by mouth daily      traMADol (ULTRAM) 50 mg tablet Take 1 tablet (50 mg total) by mouth 2 (two) times a day as needed for moderate pain 30 tablet 0     No current facility-administered medications for this visit  Allergies   Allergen Reactions    Augmentin [Amoxicillin-Pot Clavulanate] Rash      Immunizations: There is no immunization history on file for this patient     Health Maintenance:         Topic Date Due    DXA SCAN  06/23/2023 (Originally 1955)    Breast Cancer Screening: Mammogram  01/05/2024 (Originally 9/20/1995)    Colorectal Cancer Screening  05/03/2027    Hepatitis C Screening  Completed     There are no preventive care reminders to display for this patient  Medicare Screening Tests and Risk Assessments:         Health Risk Assessment:   Patient rates overall health as good  Patient feels that their physical health rating is slightly worse  Patient is satisfied with their life  Eyesight was rated as slightly worse  Hearing was rated as slightly worse  Patient feels that their emotional and mental health rating is same  Patients states they are never, rarely angry  Patient states they are sometimes unusually tired/fatigued  Pain experienced in the last 7 days has been a lot  Patient's pain rating has been 7/10  Patient states that she has experienced no weight loss or gain in last 6 months  Depression Screening:   PHQ-2 Score: 0  PHQ-9 Score: 1      Fall Risk Screening: In the past year, patient has experienced: no history of falling in past year      Urinary Incontinence Screening:   Patient has not leaked urine accidently in the last six months  Home Safety:  Patient does not have trouble with stairs inside or outside of their home  Patient has working smoke alarms and has working carbon monoxide detector  Home safety hazards include: none  Nutrition:   Current diet is Regular  Medications:   Patient is currently taking over-the-counter supplements  OTC medications include: see medication list  Patient is able to manage medications  Activities of Daily Living (ADLs)/Instrumental Activities of Daily Living (IADLs):   Walk and transfer into and out of bed and chair?: Yes  Dress and groom yourself?: Yes    Bathe or shower yourself?: Yes    Feed yourself?  Yes  Do your laundry/housekeeping?: Yes  Manage your money, pay your bills and track your expenses?: Yes  Make your own meals?: Yes    Do your own shopping?: Yes    Previous Hospitalizations:   Any hospitalizations or ED visits within the last 12 months?: Yes    How many hospitalizations have you had in the last year?: 1-2    Advance Care Planning:   Living will: No    Durable POA for healthcare: No    Advanced directive: No    Advanced directive counseling given: Yes    End of Life Decisions reviewed with patient: Yes      Comments: Patient wishes to be resuscitated if there is hope  If there is no hope they would not want to remain alive on machines  BRANDY daughter Natacha Mendez  Cognitive Screening:   Provider or family/friend/caregiver concerned regarding cognition?: No    PREVENTIVE SCREENINGS      Cardiovascular Screening:    General: Risks and Benefits Discussed      Diabetes Screening:     General: Screening Current      Colorectal Cancer Screening:     General: Screening Current and History Colorectal Cancer      Cervical Cancer Screening:    General: Screening Not Indicated      Osteoporosis Screening:    General: Screening Not Indicated and History Osteoporosis      Abdominal Aortic Aneurysm (AAA) Screening:        General: Screening Not Indicated      Lung Cancer Screening:     General: Screening Not Indicated      Hepatitis C Screening:    General: Screening Current    Screening, Brief Intervention, and Referral to Treatment (SBIRT)    Screening  Typical number of drinks in a day: 2  Typical number of drinks in a week: 12  Interpretation: Low risk drinking behavior  AUDIT-C Screenin) How often did you have a drink containing alcohol in the past year? 4 or more times a week  2) How many drinks did you have on a typical day when you were drinking in the past year?  1 to 2  3) How often did you have 6 or more drinks on one occasion in the past year? never    AUDIT-C Score: 4  Interpretation: Score 3-12 (female): POSITIVE screen for alcohol misuse    Single Item Drug Screening:  How often have you used an illegal drug (including marijuana) or a prescription medication for non-medical reasons in the past year? never    Single Item Drug Screen Score: 0  Interpretation: Negative screen for possible drug use disorder    Review of Current Opioid Use    Opioid Risk Tool (ORT) "Interpretation: Complete Opioid Risk Tool (ORT)    No results found       Physical Exam:     /84 (BP Location: Right arm, Patient Position: Sitting, Cuff Size: Large)   Pulse (!) 54   Resp 20   Ht 5' 6\" (1 676 m)   Wt 69 6 kg (153 lb 6 4 oz)   SpO2 98%   BMI 24 76 kg/m²     Physical Exam     Mary Skates, DO  "

## 2023-05-04 NOTE — PROGRESS NOTES
Assessment/Plan     1  Encounter for Medicare annual wellness exam  See other note     2  DDD (degenerative disc disease), lumbar  Using ultram as needed  Often needing 2 at a time now  Recommend updating MRI and see pain mgmt, She saw [facility] in past and was happy with them  - [facility] All Prescribed Meds and Special Instructions  - Amphetamines, Methamphetamines  - Butalbital  - Phenobarbital  - Secobarbital  - Alprazolam  - Clonazepam  - Diazepam, Temazepam, Oxazepam  - Lorazepam  - Gabapentin  - Pregabalin  - Cocaine  - Heroin  - Buprenorphine  - Levorphanol  - Meperidine  - Naltrexone  - Fentanyl  - Methadone  - Oxycodone, Oxymorphone  - Tapentadol  - THC  - Tramadol  - Codeine, Hydrocodone, Hydropmorphone, Morphine  - Bath Salts  - Ethyl Glucuronide/Ethyl Sulfate  - Kratom  - Spice  - Methylphenidate  - Phentermine  - Validity Oxidant  - Validity Creatinine  - Validity pH  - Validity Specific    3  Osteoporosis of lumbar spine  Declines DEXA at this time     4  Atypical ductal hyperplasia of right breast  Follows with breast surgeon     5  Intervertebral disc disorder with radiculopathy of lumbar region  See above     6  Low grade mucinous neoplasm of appendix  S/p surgery     7  Screening for malignant neoplasm of colon  Screening Up to date     6  Vitamin D deficiency  Update labs   - CBC and differential; Future  - Comprehensive metabolic panel; Future  - Vitamin D 25 hydroxy; Future    9  Long-term use of high-risk medication   - CBC and differential; Future  - Comprehensive metabolic panel; Future  - Vitamin D 25 hydroxy;  Future  - [facility] All Prescribed Meds and Special Instructions  - Amphetamines, Methamphetamines  - Butalbital  - Phenobarbital  - Secobarbital  - Alprazolam  - Clonazepam  - Diazepam, Temazepam, Oxazepam  - Lorazepam  - Gabapentin  - Pregabalin  - Cocaine  - Heroin  - Buprenorphine  - Levorphanol  - Meperidine  - Naltrexone  - Fentanyl  - Methadone  - Oxycodone, Oxymorphone  - Tapentadol  - THC  - Tramadol  - Codeine, Hydrocodone, Hydropmorphone, Morphine  - Bath Salts  - Ethyl Glucuronide/Ethyl Sulfate  - Kratom  - Spice  - Methylphenidate  - Phentermine  - Validity Oxidant  - Validity Creatinine  - Validity pH  - Validity Specific    10  Lumbar radiculopathy  See above  - MRI lumbar spine wo contrast; Future  - [facility] All Prescribed Meds and Special Instructions  - Amphetamines, Methamphetamines  - Butalbital  - Phenobarbital  - Secobarbital  - Alprazolam  - Clonazepam  - Diazepam, Temazepam, Oxazepam  - Lorazepam  - Gabapentin  - Pregabalin  - Cocaine  - Heroin  - Buprenorphine  - Levorphanol  - Meperidine  - Naltrexone  - Fentanyl  - Methadone  - Oxycodone, Oxymorphone  - Tapentadol  - THC  - Tramadol  - Codeine, Hydrocodone, Hydropmorphone, Morphine  - Bath Salts  - Ethyl Glucuronide/Ethyl Sulfate  - Kratom  - Spice  - Methylphenidate  - Phentermine  - Validity Oxidant  - Validity Creatinine  - Validity pH  - Validity Specific         Treatment Goals: See opioid agreement     Opiate risks  There are risks associated with opioid medications, including dependence, addiction and tolerance  The patient understands and agrees to use these medications only as prescribed  Potential side effects of the medications include, but are not limited to, constipation, drowsiness, addiction, impaired judgment and risk of fatal overdose if not taken as prescribed  The patient was warned against driving while taking sedation medications  Sharing medications is a felony  At this point in time, the patient is showing no signs of addiction, abuse, diversion or suicidal ideation  Pateint is taking concurrent benzodiazepines  Has been counseled on the risks of taking opioids and benzodiazepines including sedation, increased fall risk, dizziness, addictive potential and death  Patient is taking concurrent muscle relaxers    Has been counseled on the risks of taking opioids and muscle relaxers including sedation, increased fall risk, dizziness, addictive potential and death  Patient has a high risk condition (age > 72, MAYTE, renal or hepatic impairment, mental health condition, use of alcohol or other substances)  Has been counseled on the specific risks of taking opioids with these conditions and the increased risks including including sedation, increased fall risk, dizziness, addictive potential and death  Opioid agreement  Pain management agreement was reviewed with patient and signed/updated during visit      Drug screen  Drug screen collected during today's visit      PDMP review  [place] or [place]  reviewed  No red flags were identified; safe to proceed with prescription          Subjective     Opioid Management:   Type of visit: Follow-up    Pain related diagnoses: Lumbar degenerative disc disease    Interval history: 2 weeks of right sided leg pain  Not radiating  6/10 now  Gets up to 7/10  Takes tramadol more often latelty   Also bilateral thumbs  Takes edge off  Takes it once to twice a day alternating with tylenol  50% pain relief  - last dose yesterday  Has not had a Fioricet x 3 days  Tincture medical marijuana for sleep  2 glasses wine last night - not every night  Not mixing with pain meds  Was getting [facility]   Did not help here          Aberrant behavior?: No      Adverse effects from medication?: No      Screening Tools/Assessments:    PHQ-2/9:  Last PHQ-2 score: 0 (Last PHQ-2 date: 1/5/2023)      Drug Screen:  Date of last drug screen: 6/28/2022    Opioid agreement:  Active Opioid agreement on file?: No    Opioid agreement signed date: 6/24/2022  Opioid agreement expiration date: 6/23/2022    Naloxone:  Currently prescribed Naloxone (Narcan): No      Referrals/imaging/procedures since last visit  None     HPI  Pain Medications             butalbital-acetaminophen-caffeine (FIORICET,ESGIC) -40 mg per tablet Take 1 tablet by mouth every 4 (four) hours "as needed for headaches    traMADol (ULTRAM) 50 mg tablet Take 1 tablet (50 mg total) by mouth 2 (two) times a day as needed for moderate pain         Outpatient Morphine Milligram Equivalents Per Day     5/6/23 and after 10 MME/Day    Order Name Dose Route Frequency Maximum MME/Day     traMADol (ULTRAM) 50 mg tablet 50 mg Oral 2 times daily PRN 10 MME/Day    Total Potential Morphine Milligram Equivalents Per Day 10 MME/Day    Calculation Information        traMADol (ULTRAM) 50 mg tablet    traMADol 50 mg Tabs: single dose of 50 mg * 2 doses per day * morphine equivalence factor of 0 1 = 10 MME/Day                         PDMP Review       Value Time User    PDMP Reviewed  Yes 1/5/2023  9:34 AM Emory Dilling, DO         Review of Systems  Objective     /84 (BP Location: Right arm, Patient Position: Sitting, Cuff Size: Large)   Pulse (!) 54   Resp 20   Ht 5' 6\" (1 676 m)   Wt 69 6 kg (153 lb 6 4 oz)   SpO2 98%   BMI 24 76 kg/m²     Physical Exam  Constitutional:       Appearance: She is well-developed  HENT:      Head: Normocephalic and atraumatic  Right Ear: Tympanic membrane and ear canal normal       Left Ear: Tympanic membrane and ear canal normal       Nose: Nose normal    Eyes:      General: Lids are normal       Conjunctiva/sclera: Conjunctivae normal       Pupils: Pupils are equal, round, and reactive to light  Cardiovascular:      Rate and Rhythm: Normal rate and regular rhythm  Heart sounds: Normal heart sounds  Pulmonary:      Effort: Pulmonary effort is normal       Breath sounds: Normal breath sounds  Abdominal:      General: Bowel sounds are normal       Palpations: Abdomen is soft  Musculoskeletal:         General: Normal range of motion  Cervical back: Normal range of motion and neck supple  Lymphadenopathy:      Cervical: No cervical adenopathy  Skin:     General: Skin is warm and dry     Neurological:      Mental Status: She is alert and oriented to person, " place, and time  Psychiatric:         Behavior: Behavior normal          Thought Content:  Thought content normal          Judgment: Judgment normal          Uyen Claudio DO

## 2023-05-06 LAB
6MAM UR QL CFM: NEGATIVE NG/ML
7AMINOCLONAZEPAM UR QL CFM: NEGATIVE NG/ML
A-OH ALPRAZ UR QL CFM: NEGATIVE NG/ML
ACCEPTABLE CREAT UR QL: NORMAL MG/DL
ACCEPTIBLE SP GR UR QL: NORMAL
AMPHET UR QL CFM: NEGATIVE NG/ML
BUPRENORPHINE UR QL CFM: NEGATIVE NG/ML
BUTALBITAL UR QL CFM: ABNORMAL NG/ML
BZE UR QL CFM: NEGATIVE NG/ML
CODEINE UR QL CFM: NEGATIVE NG/ML
EDDP UR QL CFM: NEGATIVE NG/ML
ETHYL GLUCURONIDE UR QL CFM: ABNORMAL NG/ML
ETHYL SULFATE UR QL SCN: NEGATIVE NG/ML
EUTYLONE UR QL: NEGATIVE NG/ML
FENTANYL UR QL CFM: NEGATIVE NG/ML
GLIADIN IGG SER IA-ACNC: NEGATIVE NG/ML
HYDROCODONE UR QL CFM: NEGATIVE NG/ML
HYDROMORPHONE UR QL CFM: NEGATIVE NG/ML
LORAZEPAM UR QL CFM: NEGATIVE NG/ML
ME-PHENIDATE UR QL CFM: NEGATIVE NG/ML
MEPERIDINE UR QL CFM: NEGATIVE NG/ML
METHADONE UR QL CFM: NEGATIVE NG/ML
METHAMPHET UR QL CFM: NEGATIVE NG/ML
MORPHINE UR QL CFM: NEGATIVE NG/ML
NALTREXONE UR QL CFM: NEGATIVE NG/ML
NITRITE UR QL: NORMAL UG/ML
NORBUPRENORPHINE UR QL CFM: NEGATIVE NG/ML
NORDIAZEPAM UR QL CFM: NEGATIVE NG/ML
NORFENTANYL UR QL CFM: NEGATIVE NG/ML
NORHYDROCODONE UR QL CFM: NEGATIVE NG/ML
NORMEPERIDINE UR QL CFM: NEGATIVE NG/ML
NOROXYCODONE UR QL CFM: NEGATIVE NG/ML
OXAZEPAM UR QL CFM: NEGATIVE NG/ML
OXYCODONE UR QL CFM: NEGATIVE NG/ML
OXYMORPHONE UR QL CFM: NEGATIVE NG/ML
PARA-FLUOROFENTANYL QUANTIFICATION: NORMAL NG/ML
PHENOBARB UR QL CFM: NEGATIVE NG/ML
RESULT ALL_PRESCRIBED MEDS AND SPECIAL INSTRUCTIONS: NORMAL
SECOBARBITAL UR QL CFM: NEGATIVE NG/ML
SL AMB 4-ANPP QUANTIFICATION: NORMAL NG/ML
SL AMB 5F-ADB-M7 METABOLITE QUANTIFICATION: NEGATIVE NG/ML
SL AMB 7-OH-MITRAGYNINE (KRATOM ALKALOID) QUANTIFICATION: NEGATIVE NG/ML
SL AMB AB-FUBINACA-M3 METABOLITE QUANTIFICATION: NEGATIVE NG/ML
SL AMB ACETYL FENTANYL QUANTIFICATION: NORMAL NG/ML
SL AMB ACETYL NORFENTANYL QUANTIFICATION: NORMAL NG/ML
SL AMB ACRYL FENTANYL QUANTIFICATION: NORMAL NG/ML
SL AMB CARFENTANIL QUANTIFICATION: NORMAL NG/ML
SL AMB CTHC (MARIJUANA METABOLITE) QUANTIFICATION: NEGATIVE NG/ML
SL AMB DEXTRORPHAN (DEXTROMETHORPHAN METABOLITE) QUANT: NEGATIVE NG/ML
SL AMB GABAPENTIN QUANTIFICATION: NEGATIVE
SL AMB JWH018 METABOLITE QUANTIFICATION: NEGATIVE NG/ML
SL AMB JWH073 METABOLITE QUANTIFICATION: NEGATIVE NG/ML
SL AMB MDMB-FUBINACA-M1 METABOLITE QUANTIFICATION: NEGATIVE NG/ML
SL AMB METHYLONE QUANTIFICATION: NEGATIVE NG/ML
SL AMB N-DESMETHYL-TRAMADOL QUANTIFICATION: NORMAL NG/ML
SL AMB PHENTERMINE QUANTIFICATION: NEGATIVE NG/ML
SL AMB PREGABALIN QUANTIFICATION: NEGATIVE
SL AMB RCS4 METABOLITE QUANTIFICATION: NEGATIVE NG/ML
SL AMB RITALINIC ACID QUANTIFICATION: NEGATIVE NG/ML
SMOOTH MUSCLE AB TITR SER IF: NEGATIVE NG/ML
SPECIMEN DRAWN SERPL: NEGATIVE NG/ML
SPECIMEN PH ACCEPTABLE UR: NORMAL
TAPENTADOL UR QL CFM: NEGATIVE NG/ML
TEMAZEPAM UR QL CFM: NEGATIVE NG/ML
TRAMADOL UR QL CFM: NORMAL NG/ML
URATE/CREAT 24H UR: NORMAL NG/ML

## 2023-05-17 DIAGNOSIS — M51.36 DDD (DEGENERATIVE DISC DISEASE), LUMBAR: ICD-10-CM

## 2023-05-17 RX ORDER — TRAMADOL HYDROCHLORIDE 50 MG/1
50 TABLET ORAL 2 TIMES DAILY PRN
Qty: 90 TABLET | Refills: 0 | Status: SHIPPED | OUTPATIENT
Start: 2023-05-17

## 2023-05-17 NOTE — TELEPHONE ENCOUNTER
Received voicemail from patient requesting medication refill of Tramadol 50mg  Patient is requesting this be sent to [facility]  Patient is also requesting a 90 day supply if possible

## 2023-05-18 ENCOUNTER — HOSPITAL ENCOUNTER (OUTPATIENT)
Dept: RADIOLOGY | Age: 68
Discharge: HOME | End: 2023-05-18

## 2023-05-18 DIAGNOSIS — M54.16 LUMBAR RADICULOPATHY: ICD-10-CM

## 2023-08-29 ENCOUNTER — TELEPHONE (OUTPATIENT)
Dept: FAMILY MEDICINE CLINIC | Facility: CLINIC | Age: 68
End: 2023-08-29

## 2023-08-29 ENCOUNTER — OFFICE VISIT (OUTPATIENT)
Dept: FAMILY MEDICINE CLINIC | Facility: CLINIC | Age: 68
End: 2023-08-29
Payer: MEDICARE

## 2023-08-29 ENCOUNTER — APPOINTMENT (OUTPATIENT)
Dept: LAB | Facility: CLINIC | Age: 68
End: 2023-08-29
Payer: MEDICARE

## 2023-08-29 VITALS
HEART RATE: 71 BPM | SYSTOLIC BLOOD PRESSURE: 126 MMHG | HEIGHT: 66 IN | RESPIRATION RATE: 16 BRPM | BODY MASS INDEX: 24.27 KG/M2 | TEMPERATURE: 98 F | WEIGHT: 151 LBS | OXYGEN SATURATION: 96 % | DIASTOLIC BLOOD PRESSURE: 74 MMHG

## 2023-08-29 DIAGNOSIS — R50.9 FEVER IN ADULT: Primary | ICD-10-CM

## 2023-08-29 DIAGNOSIS — R51.9 ACUTE NONINTRACTABLE HEADACHE, UNSPECIFIED HEADACHE TYPE: ICD-10-CM

## 2023-08-29 DIAGNOSIS — M79.10 MYALGIA: ICD-10-CM

## 2023-08-29 DIAGNOSIS — M81.0 OSTEOPOROSIS OF LUMBAR SPINE: ICD-10-CM

## 2023-08-29 DIAGNOSIS — Z78.0 POSTMENOPAUSAL: ICD-10-CM

## 2023-08-29 DIAGNOSIS — A69.20 LYME DISEASE: ICD-10-CM

## 2023-08-29 LAB
B BURGDOR IGG SERPL QL IA: POSITIVE
B BURGDOR IGG+IGM SER QL IA: POSITIVE
B BURGDOR IGM SERPL QL IA: POSITIVE
SARS-COV-2 AG UPPER RESP QL IA: NEGATIVE
VALID CONTROL: NORMAL

## 2023-08-29 PROCEDURE — 87811 SARS-COV-2 COVID19 W/OPTIC: CPT | Performed by: FAMILY MEDICINE

## 2023-08-29 PROCEDURE — 36415 COLL VENOUS BLD VENIPUNCTURE: CPT

## 2023-08-29 PROCEDURE — 86618 LYME DISEASE ANTIBODY: CPT

## 2023-08-29 PROCEDURE — 86617 LYME DISEASE ANTIBODY: CPT

## 2023-08-29 PROCEDURE — 99214 OFFICE O/P EST MOD 30 MIN: CPT | Performed by: FAMILY MEDICINE

## 2023-08-29 NOTE — TELEPHONE ENCOUNTER
Spoke with patient, patient was away visiting her son, her son had tested for Lymes, and came out to be positive for Lymes Disease. Patient stated that she is having the same symptoms tightness in the shoulders and bodyaches. Joint pain and neck. Patient also stated that she has had a fever of 102. Patient would like a script for labs to be done. Please advise.

## 2023-08-29 NOTE — PROGRESS NOTES
Assessment/Plan:   1. Fever in adult  -     POCT Rapid Covid Ag today negative   Pt had tick bite. Son positive for Lyme. She has had it before and feels this is Lyme again, testing ordered. If negative suspect viral illness and continue symptomatic care. 2. Myalgia  -     Lyme Total AB W Reflex to IGM/IGG; Future    3. Acute nonintractable headache, unspecified headache type  -     Lyme Total AB W Reflex to IGM/IGG; Future    4. Osteoporosis of lumbar spine  Update dexa   -     DXA bone density spine hip and pelvis; Future; Expected date: 08/29/2023    5. Postmenopausal  -     DXA bone density spine hip and pelvis; Future; Expected date: 08/29/2023             There are no Patient Instructions on file for this visit. No follow-ups on file. Subjective:    CATY Maher is a 75 y.o. female who presents with:  Chief Complaint    Lyme Disease       HPI     Lyme Disease     Additional comments: Pt here to be evaluated for possible Lyme. C/o neck pain, migraine, weakness, muscle spasms, nausea, loss of appetite, intermittent fevers up to 102 for the last week. Pt states she was down [address] [facility] and had found a small tick on her, but was able to pull it off. Has not seen any bullseye rashes or obvious bites on the body. Reports this morning in the shower she noted small red areas on the arms and legs. Last edited by Ximena Muñoz LPN on 0/38/8514 53:85 AM.        ---Above per clinical staff & reviewed.  ---        Today:  Son had Bell's palsy and then had Lyme   Symptoms started Thursday   Neck pain and headache and going up the back of her neck   Fiorcet not helping   Light sensitive   Temp 102 last night   No appetite   Nausea started yesterday     The following portions of the patient's history were reviewed and updated as appropriate: allergies, current medications, past family history, past medical history, past social history, past surgical history and problem list.  Review of Systems  ROS:  all others negative - no chest pain, SOB, normal urine and bowels. no GERD. sleeping well. mood good. + fevers, headache, myalgias. No rashes or palpitations. Objective:    /74   Pulse 71   Temp 98 °F (36.7 °C)   Resp 16   Ht 5' 6" (1.676 m)   Wt 68.5 kg (151 lb)   SpO2 96%   BMI 24.37 kg/m²   Wt Readings from Last 3 Encounters:   08/29/23 68.5 kg (151 lb)   05/04/23 69.6 kg (153 lb 6.4 oz)   03/10/23 68.9 kg (152 lb)     BP Readings from Last 3 Encounters:   08/29/23 126/74   05/04/23 142/84   03/10/23 120/78       Current Medications:  Current Outpatient Medications   Medication Sig Dispense Refill   • butalbital-acetaminophen-caffeine (FIORICET,ESGIC) -40 mg per tablet Take 1 tablet by mouth every 4 (four) hours as needed for headaches 30 tablet 0   • CALCIUM-MAGNESIUM PO Take 3 tablets by mouth daily 1000-500 mg per tablet     • Cholecalciferol (Vitamin D3) 125 MCG (5000 UT) CAPS Take 1 capsule (5,000 Units total) by mouth daily Over the counter     • doxycycline hyclate (VIBRA-TABS) 100 mg tablet Take 1 tablet (100 mg total) by mouth 2 (two) times a day for 21 days 42 tablet 0   • LUTEIN PO Take 1 capsule by mouth daily      • Omega-3 Fatty Acids (fish oil) 1,000 mg Take 1,000 mg by mouth daily     • traMADol (ULTRAM) 50 mg tablet Take 1 tablet (50 mg total) by mouth 2 (two) times a day as needed for moderate pain 90 tablet 0     No current facility-administered medications for this visit. Physical Exam   Constitutional: she appears well-developed and well-nourished. HENT: Head: Normocephalic. Right Ear: External ear normal. Tympanic membrane normal.   Left Ear: External ear normal. Tympanic membrane normal.   Nose: Nose normal. No mucosal edema, No rhinorrhea. Right sinus exhibits no maxillary sinus tenderness. Left sinus exhibits no maxillary sinus tenderness. Mouth/Throat: Oropharynx is clear and moist.   Eyes: Normal conjunctiva. No erythema. No discharge.   Neck: No pain on exam. Neck supple. Cardiovascular: Normal rate, regular rhythm and normal heart sounds. Pulmonary/Chest: Effort normal and breath sounds normal.   Abdominal: Soft. Bowel sounds are normal. There is no tenderness. Lymphadenopathy: she has no cervical adenopathy. Neurological: she is alert and oriented to person, place, and time. Skin: Skin is warm and dry. Psychiatric: she has a normal mood and affect.  her behavior is normal.

## 2023-08-30 RX ORDER — DOXYCYCLINE HYCLATE 100 MG
100 TABLET ORAL 2 TIMES DAILY
Qty: 42 TABLET | Refills: 0 | Status: SHIPPED | OUTPATIENT
Start: 2023-08-30 | End: 2023-09-20

## 2024-01-30 ENCOUNTER — APPOINTMENT (OUTPATIENT)
Dept: LAB | Facility: CLINIC | Age: 69
End: 2024-01-30
Payer: MEDICARE

## 2024-01-30 ENCOUNTER — OFFICE VISIT (OUTPATIENT)
Dept: FAMILY MEDICINE CLINIC | Facility: CLINIC | Age: 69
End: 2024-01-30
Payer: MEDICARE

## 2024-01-30 VITALS
BODY MASS INDEX: 25.18 KG/M2 | HEART RATE: 59 BPM | TEMPERATURE: 98 F | DIASTOLIC BLOOD PRESSURE: 84 MMHG | WEIGHT: 156 LBS | SYSTOLIC BLOOD PRESSURE: 130 MMHG | OXYGEN SATURATION: 98 %

## 2024-01-30 DIAGNOSIS — R53.1 WEAKNESS: ICD-10-CM

## 2024-01-30 DIAGNOSIS — M46.1 SACROILIITIS (HCC): ICD-10-CM

## 2024-01-30 DIAGNOSIS — R55 SYNCOPE, UNSPECIFIED SYNCOPE TYPE: Primary | ICD-10-CM

## 2024-01-30 DIAGNOSIS — G40.309 GENERALIZED CONVULSIVE EPILEPSY (HCC): ICD-10-CM

## 2024-01-30 DIAGNOSIS — K51.819 OTHER ULCERATIVE COLITIS WITH COMPLICATION (HCC): ICD-10-CM

## 2024-01-30 DIAGNOSIS — R55 SYNCOPE, UNSPECIFIED SYNCOPE TYPE: ICD-10-CM

## 2024-01-30 LAB
ALBUMIN SERPL BCP-MCNC: 4.5 G/DL (ref 3.5–5)
ALP SERPL-CCNC: 47 U/L (ref 34–104)
ALT SERPL W P-5'-P-CCNC: 16 U/L (ref 7–52)
ANION GAP SERPL CALCULATED.3IONS-SCNC: 7 MMOL/L
AST SERPL W P-5'-P-CCNC: 19 U/L (ref 13–39)
BASOPHILS # BLD AUTO: 0.06 THOUSANDS/ÂΜL (ref 0–0.1)
BASOPHILS NFR BLD AUTO: 1 % (ref 0–1)
BILIRUB SERPL-MCNC: 0.62 MG/DL (ref 0.2–1)
BUN SERPL-MCNC: 13 MG/DL (ref 5–25)
CALCIUM SERPL-MCNC: 10.2 MG/DL (ref 8.4–10.2)
CHLORIDE SERPL-SCNC: 103 MMOL/L (ref 96–108)
CO2 SERPL-SCNC: 28 MMOL/L (ref 21–32)
CREAT SERPL-MCNC: 0.73 MG/DL (ref 0.6–1.3)
EOSINOPHIL # BLD AUTO: 0.03 THOUSAND/ÂΜL (ref 0–0.61)
EOSINOPHIL NFR BLD AUTO: 0 % (ref 0–6)
ERYTHROCYTE [DISTWIDTH] IN BLOOD BY AUTOMATED COUNT: 13.8 % (ref 11.6–15.1)
GFR SERPL CREATININE-BSD FRML MDRD: 84 ML/MIN/1.73SQ M
GLUCOSE SERPL-MCNC: 87 MG/DL (ref 65–140)
HCT VFR BLD AUTO: 47.3 % (ref 34.8–46.1)
HGB BLD-MCNC: 15.8 G/DL (ref 11.5–15.4)
IMM GRANULOCYTES # BLD AUTO: 0.01 THOUSAND/UL (ref 0–0.2)
IMM GRANULOCYTES NFR BLD AUTO: 0 % (ref 0–2)
LYMPHOCYTES # BLD AUTO: 2.2 THOUSANDS/ÂΜL (ref 0.6–4.47)
LYMPHOCYTES NFR BLD AUTO: 32 % (ref 14–44)
MCH RBC QN AUTO: 31.5 PG (ref 26.8–34.3)
MCHC RBC AUTO-ENTMCNC: 33.4 G/DL (ref 31.4–37.4)
MCV RBC AUTO: 94 FL (ref 82–98)
MONOCYTES # BLD AUTO: 0.49 THOUSAND/ÂΜL (ref 0.17–1.22)
MONOCYTES NFR BLD AUTO: 7 % (ref 4–12)
NEUTROPHILS # BLD AUTO: 4.05 THOUSANDS/ÂΜL (ref 1.85–7.62)
NEUTS SEG NFR BLD AUTO: 60 % (ref 43–75)
NRBC BLD AUTO-RTO: 0 /100 WBCS
PLATELET # BLD AUTO: 268 THOUSANDS/UL (ref 149–390)
PMV BLD AUTO: 10.5 FL (ref 8.9–12.7)
POTASSIUM SERPL-SCNC: 5.1 MMOL/L (ref 3.5–5.3)
PROT SERPL-MCNC: 7.6 G/DL (ref 6.4–8.4)
RBC # BLD AUTO: 5.02 MILLION/UL (ref 3.81–5.12)
SODIUM SERPL-SCNC: 138 MMOL/L (ref 135–147)
TSH SERPL DL<=0.05 MIU/L-ACNC: 2.37 UIU/ML (ref 0.45–4.5)
WBC # BLD AUTO: 6.84 THOUSAND/UL (ref 4.31–10.16)

## 2024-01-30 PROCEDURE — 84443 ASSAY THYROID STIM HORMONE: CPT

## 2024-01-30 PROCEDURE — 36415 COLL VENOUS BLD VENIPUNCTURE: CPT

## 2024-01-30 PROCEDURE — 85025 COMPLETE CBC W/AUTO DIFF WBC: CPT

## 2024-01-30 PROCEDURE — 99214 OFFICE O/P EST MOD 30 MIN: CPT | Performed by: FAMILY MEDICINE

## 2024-01-30 PROCEDURE — 80053 COMPREHEN METABOLIC PANEL: CPT

## 2024-01-30 PROCEDURE — 93000 ELECTROCARDIOGRAM COMPLETE: CPT | Performed by: FAMILY MEDICINE

## 2024-01-30 NOTE — PROGRESS NOTES
Assessment/Plan:   1. Syncope, unspecified syncope type  -     CBC and differential; Future; Expected date: 2024  -     TSH, 3rd generation; Future  -     Comprehensive metabolic panel; Future  -     POCT ECG  -     Ambulatory Referral to Cardiology; Future  -     Ambulatory Referral to Neurology; Future    2. Sacroiliitis (HCC)    3. Generalized convulsive epilepsy (HCC)    4. Other ulcerative colitis with complication (HCC)    5. Weakness  -     TSH, 3rd generation; Future     Differential dx reviewed with pt and .   Although likely from drop in blood pressure, she is not orthostatic today. Will check labs. Refer to cardio. With history of seizures also recommend visit with neuro. If occurs again to ER.     There are no Patient Instructions on file for this visit.  No follow-ups on file.  Subjective:    CATY Orta is a 68 y.o. female who presents with:  Chief Complaint    Dizziness; Loss of Consciousness       HPI     Loss of Consciousness     Additional comments: A few weeks ago          Last edited by Nilo Patel on 2024 11:40 AM.        ---Above per clinical staff & reviewed. ---        Today:  Occurred 6 weeks ago and again yesterday that she passed out   In chair and pushed button to get up   Felt it coming on slumped over the table   Felt woozy and like she would pass out   Felt weak. Had juice, salt and propped her feet up   Then felt cold   Nausea after   This time pain in stomach that is gone.   No chest pain, diaphoresis, shortness of breath, no palpitations, no .   Feels her heart beat sometimes weird but that is short few seconds and not related to this   No loss of control  of bowels or bladder   No room spinning   No headaches   Brendon garnett before bed   Takes a joint supplement   Uses tramadol last Friday and before that a week or so   Cooks at home. Does not over salt. Mainly home cooked.   Quart of water a day or so.       Last time eyes rolled and head went down for less  than a minute. Felt like it would come that time. Remembered things.     Twice this summer with tobiasign over it occurs     116/69 after. Heart rate 67.      The following portions of the patient's history were reviewed and updated as appropriate: allergies, current medications, past family history, past medical history, past social history, past surgical history and problem list.  Review of Systems  ROS:  all others negative - no chest pain, SOB, normal urine and bowels. no GERD. sleeping well. mood good. + palpitations.   Objective:    /84 (BP Location: Left arm, Patient Position: Standing, Cuff Size: Standard)   Pulse 59   Temp 98 °F (36.7 °C) (Temporal)   Wt 70.8 kg (156 lb)   SpO2 98%   BMI 25.18 kg/m²   Wt Readings from Last 3 Encounters:   01/30/24 70.8 kg (156 lb)   08/29/23 68.5 kg (151 lb)   05/04/23 69.6 kg (153 lb 6.4 oz)     BP Readings from Last 3 Encounters:   01/30/24 130/84   08/29/23 126/74   05/04/23 142/84       Current Medications:  Current Outpatient Medications   Medication Sig Dispense Refill   • butalbital-acetaminophen-caffeine (FIORICET,ESGIC) -40 mg per tablet Take 1 tablet by mouth every 4 (four) hours as needed for headaches 30 tablet 0   • CALCIUM-MAGNESIUM PO Take 3 tablets by mouth daily 1000-500 mg per tablet     • Cholecalciferol (Vitamin D3) 125 MCG (5000 UT) CAPS Take 1 capsule (5,000 Units total) by mouth daily Over the counter     • LUTEIN PO Take 1 capsule by mouth daily      • Omega-3 Fatty Acids (fish oil) 1,000 mg Take 1,000 mg by mouth daily     • traMADol (ULTRAM) 50 mg tablet Take 1 tablet (50 mg total) by mouth 2 (two) times a day as needed for moderate pain 90 tablet 0     No current facility-administered medications for this visit.        Physical Exam   Constitutional: she appears well-developed and well-nourished.   HENT: Head: Normocephalic.   Right Ear: External ear normal. Tympanic membrane normal.   Left Ear: External ear normal. Tympanic  membrane normal.   Nose: Nose normal. No mucosal edema, No rhinorrhea.   Right sinus exhibits no maxillary sinus tenderness.   Left sinus exhibits no maxillary sinus tenderness.   Mouth/Throat: Oropharynx is clear and moist.   Eyes: Normal conjunctiva.  No erythema. No discharge.  Neck: No pain on exam. Neck supple.   Cardiovascular: Normal rate, regular rhythm and normal heart sounds.    Pulmonary/Chest: Effort normal and breath sounds normal.   Abdominal: Soft. Bowel sounds are normal. There is no tenderness.   Lymphadenopathy: she has no cervical adenopathy.   Neurological: she is alert and oriented to person, place, and time.   Skin: Skin is warm and dry.   Psychiatric: she has a normal mood and affect. her behavior is normal.            Depression Screening and Follow-up Plan: Patient was screened for depression during today's encounter. They screened negative with a PHQ-2 score of 0.

## 2024-02-21 PROBLEM — Z12.11 SCREENING FOR MALIGNANT NEOPLASM OF COLON: Status: RESOLVED | Noted: 2020-08-03 | Resolved: 2024-02-21

## 2024-03-13 ENCOUNTER — OFFICE VISIT (OUTPATIENT)
Dept: CARDIOLOGY CLINIC | Facility: CLINIC | Age: 69
End: 2024-03-13
Payer: MEDICARE

## 2024-03-13 VITALS
OXYGEN SATURATION: 99 % | WEIGHT: 154 LBS | HEART RATE: 57 BPM | DIASTOLIC BLOOD PRESSURE: 80 MMHG | BODY MASS INDEX: 24.75 KG/M2 | HEIGHT: 66 IN | SYSTOLIC BLOOD PRESSURE: 134 MMHG

## 2024-03-13 DIAGNOSIS — I95.1 ORTHOSTATIC HYPOTENSION: ICD-10-CM

## 2024-03-13 DIAGNOSIS — R55 SYNCOPE, UNSPECIFIED SYNCOPE TYPE: Primary | ICD-10-CM

## 2024-03-13 PROCEDURE — 99204 OFFICE O/P NEW MOD 45 MIN: CPT | Performed by: INTERNAL MEDICINE

## 2024-03-13 NOTE — PROGRESS NOTES
Cardiology Consultation     You Lopez  [id number]  1955  HEART & VASCULAR [facility]  [address] [place] [id number]    1. Syncope, unspecified syncope type  Ambulatory Referral to Cardiology    Echo complete w/ contrast if indicated      2. Orthostatic hypotension  Echo complete w/ contrast if indicated          Discussion/Summary:    You's history of lightheadedness and syncope is consistent with orthostatic hypotension.  When she is bent over or lying for too long, if she gets up too quickly she will sometimes get lightheaded and she has passed out a few times.  Recommendations at this point are to avoid situations like this, keep well-hydrated and liberalize salt.  I even recommended obtaining salt tablets.  I also asked her to check her blood pressure a bit more when at home and even during times of symptoms.  For completeness we are going to rule out structural heart disease with an echocardiogram.  If this is normal then all that is recommended is lifestyle changes as stated.  She then can just see us back as needed.      HPI:    Mrs. Lopez comes in for consultation to discuss several episodes of syncope that have occurred over the last several months.  You is a 68-year-old healthy female with no prior cardiac history and she is not treated for any risk factors for cardiovascular disease.  You started experiencing episodes of orthostatic type syncope and presyncope.  It is very situational in which it occurs when she is laying or sitting for a significant amount of time and then gets up too quickly.  It also has occurred in which she was gardening and bent over for a while and then stood up quickly.  On a few of these occasions she did lose consciousness.  She would get help from her  and she would be helped over to a chair.  She would then drink either water or even tomato juice and also put her  feet up.  She would even sometimes feel cold or have nauseous afterwards.  More recently she has been curtailing her activity and not standing up very quickly and she has been able to avoid the symptoms.    You otherwise is asymptomatic.  She is active without limitations.  She does not have any random lightheadedness or syncope.  This is all situational.  She denies palpitations.  No chest pain or any symptoms of angina.  She has no exertional symptoms.  She denies shortness of breath or any signs/symptoms of CHF.  You does check her blood pressures at home and she states that it does run low on average.      Patient Active Problem List   Diagnosis    Intervertebral disc disorder with radiculopathy of lumbar region    Osteoporosis of lumbar spine    DDD (degenerative disc disease), lumbar    Fibromyalgia    Generalized convulsive epilepsy (HCC)    Kyphoscoliosis    Migraine without aura    Varicose veins    Vitamin D deficiency    Ulcerative colitis (HCC)    Proctosigmoiditis    Low grade mucinous neoplasm of appendix    Sacroiliitis (HCC)    Atypical ductal hyperplasia of right breast     Past Medical History:   Diagnosis Date    Atypical ductal hyperplasia of right breast     Feb 2010 (David Guevara/[facility])     Decreased libido     Disorder of bone and cartilage     Resolved 5/29/2015     Dysplasia of cervix     s/p cone biopsy - 2002    Epilepsy (HCC)     has not had any episodes in 20 years    Fibromyalgia     1988 no specialists, no meds    Fibromyalgia, primary     Hypothyroidism, unspecified     Resolved 5/29/2015     Menopause     Sept 2008     Osteoarthrosis of hand     Left    Osteoporosis     Resolved 5/29/2015     Seizure grand mal (HCC)     Seizures (HCC)     last seizure over twenty years ago    Ulcerative colitis (HCC)     last c-scope 8 yrs ago wnl, treated with suppositories in the past- not active x 8-10 yrs    Uterine fibroid     Vitamin D deficiency, unspecified     Resolved 5/29/2015      Social  History     Socioeconomic History    Marital status: /Civil Union     Spouse name: Not on file    Number of children: 3    Years of education: Not on file    Highest education level: Not on file   Occupational History    Occupation: Retired    Tobacco Use    Smoking status: Former     Current packs/day: 0.00     Average packs/day: 0.3 packs/day for 9.5 years (2.4 ttl pk-yrs)     Types: Cigarettes     Start date: 3/26/1961     Quit date: 1970     Years since quittin.5    Smokeless tobacco: Never   Vaping Use    Vaping status: Former    Quit date: 2/15/2023    Substances: THC   Substance and Sexual Activity    Alcohol use: Yes     Alcohol/week: 10.0 standard drinks of alcohol     Types: 10 Glasses of wine per week    Drug use: Not Currently     Frequency: 7.0 times per week     Types: Marijuana    Sexual activity: Not Currently   Other Topics Concern    Not on file   Social History Narrative    Not on file     Social Determinants of Health     Financial Resource Strain: Low Risk  (2023)    Overall Financial Resource Strain (CARDIA)     Difficulty of Paying Living Expenses: Not hard at all   Food Insecurity: No Food Insecurity (2023)    Hunger Vital Sign     Worried About Running Out of Food in the Last Year: Never true     Ran Out of Food in the Last Year: Never true   Transportation Needs: No Transportation Needs (2023)    PRAPARE - Transportation     Lack of Transportation (Medical): No     Lack of Transportation (Non-Medical): No   Physical Activity: Not on file   Stress: Not on file   Social Connections: Not on file   Intimate Partner Violence: Not on file   Housing Stability: Low Risk  (2023)    Housing Stability Vital Sign     Unable to Pay for Housing in the Last Year: No     Number of Places Lived in the Last Year: 1     Unstable Housing in the Last Year: No      Family History   Problem Relation Age of Onset    Hypertension Father     Heart disease Father     Heart attack  "Father     Fibromyalgia Mother     Osteoporosis Mother     Colon cancer Maternal Grandfather     Colon cancer Cousin      Past Surgical History:   Procedure Laterality Date    BREAST SURGERY Right 03/2010    [facility] , left lumpectomies [facility] ~ 1990       CERVICAL CONE BIOPSY  2002    COLONOSCOPY      ME LAPAROSCOPY COLECTOMY PARTIAL W/ANASTOMOSIS N/A 10/30/2020    Procedure: RESECTION COLON RIGHT LAPAROSCOPIC/ Cecoappendectomy;  Surgeon: AALIYAH Guevara MD;  Location: [facility];  Service: Colorectal    UTERINE FIBROID SURGERY         Current Outpatient Medications:     butalbital-acetaminophen-caffeine (FIORICET,ESGIC) -40 mg per tablet, Take 1 tablet by mouth every 4 (four) hours as needed for headaches, Disp: 30 tablet, Rfl: 0    CALCIUM-MAGNESIUM PO, Take 3 tablets by mouth daily 1000-500 mg per tablet, Disp: , Rfl:     Cholecalciferol (Vitamin D3) 125 MCG (5000 UT) CAPS, Take 1 capsule (5,000 Units total) by mouth daily Over the counter, Disp: , Rfl:     LUTEIN PO, Take 1 capsule by mouth daily , Disp: , Rfl:     traMADol (ULTRAM) 50 mg tablet, Take 1 tablet (50 mg total) by mouth 2 (two) times a day as needed for moderate pain, Disp: 90 tablet, Rfl: 0    Omega-3 Fatty Acids (fish oil) 1,000 mg, Take 1,000 mg by mouth daily, Disp: , Rfl:   Allergies   Allergen Reactions    Augmentin [Amoxicillin-Pot Clavulanate] Rash     Vitals:    03/13/24 1619   BP: 134/80   Pulse: 57   SpO2: 99%   Weight: 69.9 kg (154 lb)   Height: 5' 6\" (1.676 m)       Labs:  Lab Results   Component Value Date    K 5.1 01/30/2024     01/30/2024    CO2 28 01/30/2024    BUN 13 01/30/2024    CREATININE 0.73 01/30/2024    CALCIUM 10.2 01/30/2024     Lab Results   Component Value Date    WBC 6.84 01/30/2024    HGB 15.8 (H) 01/30/2024    HCT 47.3 (H) 01/30/2024    MCV 94 01/30/2024     01/30/2024     TSH normal.    Imaging:  ECG obtained at her PCPs office demonstrated sinus bradycardia and was otherwise within normal " "limits.    Review of Systems:  Review of Systems   Constitutional: Negative.    HENT: Negative.     Eyes: Negative.    Respiratory: Negative.     Cardiovascular: Negative.    Gastrointestinal: Negative.    Musculoskeletal: Negative.    Skin: Negative.    Allergic/Immunologic: Negative.    Neurological:  Positive for syncope and light-headedness.   Hematological: Negative.    Psychiatric/Behavioral: Negative.     All other systems reviewed and are negative.    Vitals:    03/13/24 1619   BP: 134/80   Pulse: 57   SpO2: 99%   Weight: 69.9 kg (154 lb)   Height: 5' 6\" (1.676 m)     Physical Exam  Constitutional:       Appearance: She is well-developed.   HENT:      Head: Normocephalic and atraumatic.   Eyes:      General: No scleral icterus.        Right eye: No discharge.         Left eye: No discharge.      Pupils: Pupils are equal, round, and reactive to light.   Neck:      Thyroid: No thyromegaly.      Vascular: No JVD.   Cardiovascular:      Rate and Rhythm: Normal rate and regular rhythm. No extrasystoles are present.     Pulses: Normal pulses. No decreased pulses.      Heart sounds: Normal heart sounds, S1 normal and S2 normal. No murmur heard.     No friction rub. No gallop.   Pulmonary:      Effort: Pulmonary effort is normal. No respiratory distress.      Breath sounds: Normal breath sounds. No wheezing, rhonchi or rales.   Abdominal:      General: Bowel sounds are normal. There is no distension.      Palpations: Abdomen is soft.      Tenderness: There is no abdominal tenderness.   Musculoskeletal:         General: No tenderness or deformity. Normal range of motion.      Cervical back: Normal range of motion and neck supple.      Right lower leg: No edema.      Left lower leg: No edema.   Skin:     General: Skin is warm and dry.      Findings: No rash.   Neurological:      Mental Status: She is alert and oriented to person, place, and time.      Cranial Nerves: No cranial nerve deficit.   Psychiatric:         " Thought Content: Thought content normal.         Judgment: Judgment normal.       Counseling / Coordination of Care  Total office time spent today 40 minutes.  Greater than 50% of total time was spent with the patient and / or family counseling and / or coordination of care.

## 2024-04-03 ENCOUNTER — HOSPITAL ENCOUNTER (OUTPATIENT)
Dept: NON INVASIVE DIAGNOSTICS | Facility: CLINIC | Age: 69
Discharge: HOME | End: 2024-04-03
Payer: MEDICARE

## 2024-04-03 VITALS
BODY MASS INDEX: 24.77 KG/M2 | HEART RATE: 57 BPM | HEIGHT: 66 IN | DIASTOLIC BLOOD PRESSURE: 80 MMHG | SYSTOLIC BLOOD PRESSURE: 134 MMHG | WEIGHT: 154.1 LBS

## 2024-04-03 DIAGNOSIS — I95.1 ORTHOSTATIC HYPOTENSION: ICD-10-CM

## 2024-04-03 DIAGNOSIS — R55 SYNCOPE, UNSPECIFIED SYNCOPE TYPE: ICD-10-CM

## 2024-04-03 LAB
AORTIC ROOT: 3 CM
APICAL FOUR CHAMBER EJECTION FRACTION: 66 %
ASCENDING AORTA: 3 CM
BSA FOR ECHO PROCEDURE: 1.79 M2
E WAVE DECELERATION TIME: 151 MS
E/A RATIO: 0.88
FRACTIONAL SHORTENING: 37 (ref 28–44)
INTERVENTRICULAR SEPTUM IN DIASTOLE (PARASTERNAL SHORT AXIS VIEW): 1 CM
INTERVENTRICULAR SEPTUM: 1 CM (ref 0.6–1.1)
LAAS-AP2: 13.2 CM2
LAAS-AP4: 7.4 CM2
LEFT ATRIUM SIZE: 2.8 CM
LEFT ATRIUM VOLUME (MOD BIPLANE): 25 ML
LEFT ATRIUM VOLUME INDEX (MOD BIPLANE): 14 ML/M2
LEFT INTERNAL DIMENSION IN SYSTOLE: 2.6 CM (ref 2.1–4)
LEFT VENTRICULAR INTERNAL DIMENSION IN DIASTOLE: 4.1 CM (ref 3.5–6)
LEFT VENTRICULAR POSTERIOR WALL IN END DIASTOLE: 1 CM
LEFT VENTRICULAR STROKE VOLUME: 50 ML
LVSV (TEICH): 50 ML
MV E'TISSUE VEL-LAT: 8 CM/S
MV E'TISSUE VEL-SEP: 6 CM/S
MV PEAK A VEL: 0.5 M/S
MV PEAK E VEL: 44 CM/S
MV STENOSIS PRESSURE HALF TIME: 44 MS
MV VALVE AREA P 1/2 METHOD: 5
RA PRESSURE ESTIMATED: 3 MMHG
RIGHT ATRIUM AREA SYSTOLE A4C: 12.2 CM2
RIGHT VENTRICLE ID DIMENSION: 3 CM
RV PSP: 24 MMHG
SL CV LEFT ATRIUM LENGTH A2C: 5.4 CM
SL CV LV EF: 65
SL CV PED ECHO LEFT VENTRICLE DIASTOLIC VOLUME (MOD BIPLANE) 2D: 76 ML
SL CV PED ECHO LEFT VENTRICLE SYSTOLIC VOLUME (MOD BIPLANE) 2D: 26 ML
TR MAX PG: 21 MMHG
TR PEAK VELOCITY: 2.3 M/S
TRICUSPID VALVE PEAK REGURGITATION VELOCITY: 2.29 M/S

## 2024-04-03 PROCEDURE — 93306 TTE W/DOPPLER COMPLETE: CPT | Performed by: INTERNAL MEDICINE

## 2024-04-03 PROCEDURE — 93306 TTE W/DOPPLER COMPLETE: CPT

## 2024-08-12 ENCOUNTER — OFFICE VISIT (OUTPATIENT)
Dept: FAMILY MEDICINE CLINIC | Facility: CLINIC | Age: 69
End: 2024-08-12
Payer: MEDICARE

## 2024-08-12 ENCOUNTER — APPOINTMENT (OUTPATIENT)
Dept: LAB | Facility: CLINIC | Age: 69
End: 2024-08-12
Payer: MEDICARE

## 2024-08-12 VITALS
BODY MASS INDEX: 24.43 KG/M2 | WEIGHT: 152 LBS | DIASTOLIC BLOOD PRESSURE: 82 MMHG | HEIGHT: 66 IN | TEMPERATURE: 97.9 F | RESPIRATION RATE: 16 BRPM | OXYGEN SATURATION: 98 % | SYSTOLIC BLOOD PRESSURE: 132 MMHG | HEART RATE: 73 BPM

## 2024-08-12 DIAGNOSIS — R19.7 DIARRHEA, UNSPECIFIED TYPE: ICD-10-CM

## 2024-08-12 DIAGNOSIS — E55.9 VITAMIN D DEFICIENCY: ICD-10-CM

## 2024-08-12 DIAGNOSIS — M51.36 DDD (DEGENERATIVE DISC DISEASE), LUMBAR: ICD-10-CM

## 2024-08-12 DIAGNOSIS — B00.1 RECURRENT VESICULAR DERMATITIS DUE TO HERPES SIMPLEX VIRUS (HSV): ICD-10-CM

## 2024-08-12 DIAGNOSIS — A69.20 LYME DISEASE: Primary | ICD-10-CM

## 2024-08-12 DIAGNOSIS — A69.20 LYME DISEASE: ICD-10-CM

## 2024-08-12 LAB
25(OH)D3 SERPL-MCNC: 51.4 NG/ML (ref 30–100)
ALBUMIN SERPL BCG-MCNC: 4.3 G/DL (ref 3.5–5)
ALP SERPL-CCNC: 52 U/L (ref 34–104)
ALT SERPL W P-5'-P-CCNC: 14 U/L (ref 7–52)
ANION GAP SERPL CALCULATED.3IONS-SCNC: 7 MMOL/L (ref 4–13)
AST SERPL W P-5'-P-CCNC: 19 U/L (ref 13–39)
BASOPHILS # BLD AUTO: 0.04 THOUSANDS/ÂΜL (ref 0–0.1)
BASOPHILS NFR BLD AUTO: 1 % (ref 0–1)
BILIRUB SERPL-MCNC: 0.45 MG/DL (ref 0.2–1)
BUN SERPL-MCNC: 14 MG/DL (ref 5–25)
CALCIUM SERPL-MCNC: 9.7 MG/DL (ref 8.4–10.2)
CHLORIDE SERPL-SCNC: 103 MMOL/L (ref 96–108)
CO2 SERPL-SCNC: 28 MMOL/L (ref 21–32)
CREAT SERPL-MCNC: 0.64 MG/DL (ref 0.6–1.3)
CRP SERPL QL: 5 MG/L
EOSINOPHIL # BLD AUTO: 0.06 THOUSAND/ÂΜL (ref 0–0.61)
EOSINOPHIL NFR BLD AUTO: 1 % (ref 0–6)
ERYTHROCYTE [DISTWIDTH] IN BLOOD BY AUTOMATED COUNT: 13.4 % (ref 11.6–15.1)
ERYTHROCYTE [SEDIMENTATION RATE] IN BLOOD: 23 MM/HOUR (ref 0–29)
GFR SERPL CREATININE-BSD FRML MDRD: 91 ML/MIN/1.73SQ M
GLUCOSE SERPL-MCNC: 80 MG/DL (ref 65–140)
HCT VFR BLD AUTO: 46.1 % (ref 34.8–46.1)
HGB BLD-MCNC: 15.4 G/DL (ref 11.5–15.4)
IMM GRANULOCYTES # BLD AUTO: 0.03 THOUSAND/UL (ref 0–0.2)
IMM GRANULOCYTES NFR BLD AUTO: 0 % (ref 0–2)
LYMPHOCYTES # BLD AUTO: 2.23 THOUSANDS/ÂΜL (ref 0.6–4.47)
LYMPHOCYTES NFR BLD AUTO: 27 % (ref 14–44)
MCH RBC QN AUTO: 31.6 PG (ref 26.8–34.3)
MCHC RBC AUTO-ENTMCNC: 33.4 G/DL (ref 31.4–37.4)
MCV RBC AUTO: 95 FL (ref 82–98)
MONOCYTES # BLD AUTO: 0.65 THOUSAND/ÂΜL (ref 0.17–1.22)
MONOCYTES NFR BLD AUTO: 8 % (ref 4–12)
NEUTROPHILS # BLD AUTO: 5.35 THOUSANDS/ÂΜL (ref 1.85–7.62)
NEUTS SEG NFR BLD AUTO: 63 % (ref 43–75)
NRBC BLD AUTO-RTO: 0 /100 WBCS
PLATELET # BLD AUTO: 261 THOUSANDS/UL (ref 149–390)
PMV BLD AUTO: 11.1 FL (ref 8.9–12.7)
POTASSIUM SERPL-SCNC: 4.2 MMOL/L (ref 3.5–5.3)
PROT SERPL-MCNC: 7.1 G/DL (ref 6.4–8.4)
RBC # BLD AUTO: 4.87 MILLION/UL (ref 3.81–5.12)
SODIUM SERPL-SCNC: 138 MMOL/L (ref 135–147)
WBC # BLD AUTO: 8.36 THOUSAND/UL (ref 4.31–10.16)

## 2024-08-12 PROCEDURE — 80053 COMPREHEN METABOLIC PANEL: CPT

## 2024-08-12 PROCEDURE — 36415 COLL VENOUS BLD VENIPUNCTURE: CPT

## 2024-08-12 PROCEDURE — 85025 COMPLETE CBC W/AUTO DIFF WBC: CPT

## 2024-08-12 PROCEDURE — 85652 RBC SED RATE AUTOMATED: CPT

## 2024-08-12 PROCEDURE — 82306 VITAMIN D 25 HYDROXY: CPT

## 2024-08-12 PROCEDURE — 86140 C-REACTIVE PROTEIN: CPT

## 2024-08-12 PROCEDURE — 99214 OFFICE O/P EST MOD 30 MIN: CPT | Performed by: FAMILY MEDICINE

## 2024-08-12 PROCEDURE — G2211 COMPLEX E/M VISIT ADD ON: HCPCS | Performed by: FAMILY MEDICINE

## 2024-08-12 RX ORDER — VALACYCLOVIR HYDROCHLORIDE 1 G/1
2000 TABLET, FILM COATED ORAL 2 TIMES DAILY
Qty: 12 TABLET | Refills: 5 | Status: SHIPPED | OUTPATIENT
Start: 2024-08-12 | End: 2024-08-13

## 2024-08-12 RX ORDER — TRAMADOL HYDROCHLORIDE 50 MG/1
50 TABLET ORAL 2 TIMES DAILY PRN
Qty: 90 TABLET | Refills: 0 | Status: SHIPPED | OUTPATIENT
Start: 2024-08-12

## 2024-08-12 NOTE — PROGRESS NOTES
Ambulatory Visit  Name: You Lopez      : 1955      MRN: [id number]  Encounter Provider: Caty Jerome DO  Encounter Date: 2024   Encounter department: [facility]    Assessment & Plan   1. Lyme disease  -     C-reactive protein; Future  -     CBC and differential; Future; Expected date: 2024  -     Sedimentation rate, automated; Future  2. DDD (degenerative disc disease), lumbar  -     traMADol (ULTRAM) 50 mg tablet; Take 1 tablet (50 mg total) by mouth 2 (two) times a day as needed for moderate pain  3. Diarrhea, unspecified type  -     Comprehensive metabolic panel; Future  4. 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 Vitamin D deficiency  -     Vitamin D 25 hydroxy; Future       History of Present Illness   {Disappearing Hyperlinks I Encounters * My Last Note * Since Last Visit * History :39916}  HPI  Bit by a fly end    Bull's eye in the area    Headaches for 2 -3 minutes - unusal, joint pain, usually in arms, last night both of her legs   Pain in right wrist and right elbow   Blood test   Doxy 100 mg twice a day x 30 days and a steroid pack (due to hives from stress)  Started to feel better   Was given antibiotics from -24  Using grape seed extract targets the bacteria   Extra zinc, D 5000 daily but usually 1 -2 times a week   Advil and heating pad   Tramadol as needed - now and then - twice a month   Nerve pain in right side   Has been constipated recently      Review of Systems  {Select to Display PM (Optional):18781}  Objective   {Disappearing Hyperlinks   Review Vitals * Enter New Vitals * Results Review * Labs * Imaging * Cardiology * Procedures * Lung Cancer Screening :31342}  /82 (BP Location: Left arm, Patient Position: Sitting, Cuff Size: Standard)   Pulse 73   Temp 97.9 °F (36.6  "°C) (Temporal)   Resp 16   Ht 5' 6\" (1.676 m)   Wt 68.9 kg (152 lb)   SpO2 98%   BMI 24.53 kg/m²     Physical Exam  Administrative Statements {Disappearing Hyperlinks I  [facility]}  {Time Spent Statement (Optional):93531}          Current Outpatient Medications   Medication Sig Dispense Refill   • butalbital-acetaminophen-caffeine (FIORICET,ESGIC) -40 mg per tablet Take 1 tablet by mouth every 4 (four) hours as needed for headaches 30 tablet 0   • CALCIUM-MAGNESIUM PO Take 3 tablets by mouth daily 1000-500 mg per tablet     • Cholecalciferol (Vitamin D3) 125 MCG (5000 UT) CAPS Take 1 capsule (5,000 Units total) by mouth daily Over the counter     • LUTEIN PO Take 1 capsule by mouth daily      • traMADol (ULTRAM) 50 mg tablet Take 1 tablet (50 mg total) by mouth 2 (two) times a day as needed for moderate pain 90 tablet 0   • valACYclovir (VALTREX) 1,000 mg tablet Take 2 tablets (2,000 mg total) by mouth 2 (two) times a day for 1 day 12 tablet 5   • Omega-3 Fatty Acids (fish oil) 1,000 mg Take 1,000 mg by mouth daily       No current facility-administered medications for this visit.       Wt Readings from Last 3 Encounters:   08/12/24 68.9 kg (152 lb)   04/03/24 69.9 kg (154 lb 1.6 oz)   03/13/24 69.9 kg (154 lb)     Temp Readings from Last 3 Encounters:   08/12/24 97.9 °F (36.6 °C) (Temporal)   01/30/24 98 °F (36.7 °C) (Temporal)   08/29/23 98 °F (36.7 °C)     BP Readings from Last 3 Encounters:   08/12/24 132/82   04/03/24 134/80   03/13/24 134/80     Pulse Readings from Last 3 Encounters:   08/12/24 73   04/03/24 57   03/13/24 57       PHQ-9 Depression Screening              "

## 2024-08-12 NOTE — PROGRESS NOTES
1. Lyme disease  -     C-reactive protein; Future  -     CBC and differential; Future; Expected date: 08/13/2024  -     Sedimentation rate, automated; Future  2. DDD (degenerative disc disease), lumbar  -     traMADol (ULTRAM) 50 mg tablet; Take 1 tablet (50 mg total) by mouth 2 (two) times a day as needed for moderate pain  3. Diarrhea, unspecified type  -     Comprehensive metabolic panel; Future  4. 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 Vitamin D deficiency  -     Vitamin D 25 hydroxy; Future     Record request today for visit and labs when she was diagnosed with Lyme  Received 21 days of treatment with doxy 100 mg twice a day   Will check other labs today   Suspect will need more time, can try NSAIDs to help. She prefers herbals.  Uses tramadol as needed. Last prescription more than a year ago   Opioid agreement, COMM and BPI updated today   Controlled Substance Review  [place] PDMP or [place]  reviewed: No red flags were identified; safe to proceed with prescription..  Risks and benefits of Tramadol reviewed. No side effects.   Prescription valtrex for recurrent HSV rash on leg   Declines HM and vaccines     Return for virtual visit: Medicare wellness visit .    Subjective:   You is a 68 y.o. female here today for a follow-up on her current medical conditions:    Patient Active Problem List   Diagnosis    Intervertebral disc disorder with radiculopathy of lumbar region    Osteoporosis of lumbar spine    DDD (degenerative disc disease), lumbar    Fibromyalgia    Generalized convulsive epilepsy (HCC)    Kyphoscoliosis    Migraine without aura    Varicose veins    Vitamin D deficiency    Ulcerative colitis (HCC)    Proctosigmoiditis    Low grade mucinous neoplasm of appendix    Sacroiliitis (HCC)    Atypical ductal hyperplasia of right breast         Patient Care  Team:  Caty Jerome DO as PCP - General (Family Medicine)    Current Medications:  Current Outpatient Medications   Medication Sig Dispense Refill    butalbital-acetaminophen-caffeine (FIORICET,ESGIC) -40 mg per tablet Take 1 tablet by mouth every 4 (four) hours as needed for headaches 30 tablet 0    CALCIUM-MAGNESIUM PO Take 3 tablets by mouth daily 1000-500 mg per tablet      Cholecalciferol (Vitamin D3) 125 MCG (5000 UT) CAPS Take 1 capsule (5,000 Units total) by mouth daily Over the counter      LUTEIN PO Take 1 capsule by mouth daily       traMADol (ULTRAM) 50 mg tablet Take 1 tablet (50 mg total) by mouth 2 (two) times a day as needed for moderate pain 90 tablet 0    valACYclovir (VALTREX) 1,000 mg tablet Take 2 tablets (2,000 mg total) by mouth 2 (two) times a day for 1 day 12 tablet 5    Omega-3 Fatty Acids (fish oil) 1,000 mg Take 1,000 mg by mouth daily       No current facility-administered medications for this visit.       HPI:  Chief Complaint   Patient presents with    lymes disease     Patient was diagnosed recently with lymes at urgent care on [facility]. Finished her last dose of antibiotic this past Friday.   Coldsore on her right ankle area.    Headache     H/A that lasts a minute or two    Cramps     Cramps in bilateral arms and legs. Pain rated at 7/10. Had to use her tramadol.     Constipation     C/O severe constipation which is not usual for her.      Joint Pain     Right wrist.     -- Above per clinical staff and reviewed. --    PHQ-2/9 Depression Screening                watch bp   refuses bw. Jan 2021 - maybe before next visit   decliend labs   see Sept fbw - vit D low. 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  7   due for UDS   dexa last 2013   HIV   declines mammo   Today:     History of Present Illness   Bit by a fly end June   Bull's eye in the area July 20  "  Headaches for 2 -3 minutes - unusal, joint pain, usually in arms, last night both of her legs   Pain in right wrist and right elbow   Blood test   Doxy 100 mg twice a day x 30 days and a steroid pack (due to hives from stress)  Started to feel better   Was given antibiotics from 7/20-8/9/24  Using grape seed extract targets the bacteria   Extra zinc, D 5000 daily but usually 1 -2 times a week   Advil and heating pad   Tramadol as needed - now and then - twice a month   Nerve pain in right side   Has been constipated recently      The following portions of the patient's history were reviewed and updated as appropriate: allergies, current medications, past family history, past medical history, past social history, past surgical history and problem list.    Objective:  Vitals:  /82 (BP Location: Left arm, Patient Position: Sitting, Cuff Size: Standard)   Pulse 73   Temp 97.9 °F (36.6 °C) (Temporal)   Resp 16   Ht 5' 6\" (1.676 m)   Wt 68.9 kg (152 lb)   SpO2 98%   BMI 24.53 kg/m²    Wt Readings from Last 3 Encounters:   08/12/24 68.9 kg (152 lb)   04/03/24 69.9 kg (154 lb 1.6 oz)   03/13/24 69.9 kg (154 lb)      BP Readings from Last 3 Encounters:   08/12/24 132/82   04/03/24 134/80   03/13/24 134/80        Review of Systems   She has no other concerns. No unexpected weight changes. No chest pain, SOB, or palpitations. No GERD. No changes in bowels or bladder. Sleeping well. No mood changes. + joint and muscle pain. + headache +fatigue     Physical Exam   Constitutional:  she appears well-developed and well-nourished.  HENT: Head: Normocephalic.   Neck: Neck supple.   Cardiovascular: Normal rate, regular rhythm and normal heart sounds.   Pulmonary/Chest: Effort normal and breath sounds normal. No wheezes, rales, or rhonchi.   Abdominal: Soft. Bowel sounds are normal. There is no tenderness. No hepatosplenomegaly.   Musculoskeletal: she exhibits no edema.   Lymphadenopathy: she has no cervical adenopathy. "   Neurological: she is alert and oriented to person, place, and time.   Skin: Skin is warm and dry. Right lower leg patch erythema with vesicles.   Psychiatric: she has a normal mood and affect. her behavior is normal. Thought content normal.       Addendum - report from [facility] confirms negative Lyme IgM.

## 2024-08-15 ENCOUNTER — TELEPHONE (OUTPATIENT)
Dept: FAMILY MEDICINE CLINIC | Facility: CLINIC | Age: 69
End: 2024-08-15

## 2024-08-15 NOTE — TELEPHONE ENCOUNTER
Faxed over medical release form to [facility] on [facility] To obtain most recent visit summary. 8/15/24 at 9 am Fax # [phone].

## 2024-10-01 ENCOUNTER — TELEPHONE (OUTPATIENT)
Age: 69
End: 2024-10-01

## 2024-10-01 DIAGNOSIS — B00.1 RECURRENT VESICULAR DERMATITIS DUE TO HERPES SIMPLEX VIRUS (HSV): Primary | ICD-10-CM

## 2024-10-01 NOTE — TELEPHONE ENCOUNTER
Pt called to request to have Dr Jerome recommend a dermatologist and place a referral for her.  She would like to have them take a look at the ongoing infection on her leg.  Offered to schedule appt with Dr Jerome to evaluate and discuss, but pt said she is aware of it.  She said she had seen her recently and Dr Jerome had given her another cream to try, but it isn't really helping.  She'd like to see dermatology now.  Please call pt when referral has been placed.  Thank you!

## 2024-10-02 NOTE — TELEPHONE ENCOUNTER
Referral placed.   Please advise her [facility] has a long wait list and she may want to schedule elsewhere.   Here is a list of other providers:  Dermatology Referral Names    [facility]  [facility]  [address]  [address]  [place]  [address]   Phone:  [phone] Fax: [phone]     [facility]  [facility]  [address]  [address]  [place]  [address]   Phone:  [phone])     [facility]  [address]  [address]  [place]  [address]   Phone:  [phone] Fax: [phone]     [facility]  [facility]  [address] [place]  [address]   Phone:  [phone] Fax: [phone]     [facility]   [facility]  Lien Oneill MD, FAAD   Barbara Nieto DO FAAD   Jennifer Bennett MD, FAAD   [address], [address]   [place] [address] [phone]    Dr. Andres Morrison  [address], [address], [place] [address]   [address] [facility] [address] [address]  [address] [place] [address] [phone]    [facility], [facility]  [address]  [address]  [place]  [address] [phone]           Fax:    [phone]    [facility],   [address], [address]   [place] [address] [phone]     Dr. Jennifer Corral   [address] [place] [address] Rd[address],   [place] [address] [phone]     [facility]  [facility], [address]  [place] [address] [phone]     [facility],  Dr. Wayne Lugo   [address] [place] [address]   [phone]      [facility]   [address] [place] [address] [phone]      [facility]  [address], [place] [address] [phone]      [facility]  [address] [place], [place] [address]  [phone]  [address] [place] [address]  [phone]     [facility]   Dr. Lukasz Justin   [address] [address]   [phone]

## 2025-02-24 ENCOUNTER — TELEPHONE (OUTPATIENT)
Dept: FAMILY MEDICINE CLINIC | Facility: CLINIC | Age: 70
End: 2025-02-24

## 2025-02-25 NOTE — TELEPHONE ENCOUNTER
Contacted patient to schedule awv. Patient did not want to schedule at this time. Notified patient she will need to schedule awv to continue receiving refills. Patient verbalized understanding, but did not want to schedule at this time.

## 2025-08-21 ENCOUNTER — VBI (OUTPATIENT)
Dept: ADMINISTRATIVE | Facility: OTHER | Age: 70
End: 2025-08-21

## (undated) DEVICE — ENDOSCOPIC LINEAR CUTTER RELOADS BLUE 3.5MM: Brand: ECHELON; ENDOPATH

## (undated) DEVICE — POOLE SUCTION HANDLE: Brand: CARDINAL HEALTH

## (undated) DEVICE — PLUMEPEN PRO 10FT

## (undated) DEVICE — ANTI-FOG SOLUTION WITH FOAM PAD: Brand: DEVON

## (undated) DEVICE — 3M™ IOBAN™ 2 ANTIMICROBIAL INCISE DRAPE 6650EZ: Brand: IOBAN™ 2

## (undated) DEVICE — SUT PDS II 1 CTX-B 36 IN ZB371

## (undated) DEVICE — TROCAR: Brand: KII SLEEVE

## (undated) DEVICE — CHLORAPREP HI-LITE 26ML ORANGE

## (undated) DEVICE — INSUFLATION TUBING INSUFLOW (LEXION)

## (undated) DEVICE — STERILE EMESIS BASIN                 070: Brand: CARDINAL HEALTH

## (undated) DEVICE — IRRIG ENDO FLO TUBING

## (undated) DEVICE — STERILE COTTON TIPPED APPLICATORS: Brand: PURITAN

## (undated) DEVICE — BETHLEHEM MAJOR GENERAL PACK: Brand: CARDINAL HEALTH

## (undated) DEVICE — SUT VICRYL 0 UR-6 27 IN J603H

## (undated) DEVICE — GLOVE INDICATOR PI UNDERGLOVE SZ 8 BLUE

## (undated) DEVICE — ADHESIVE SKIN HIGH VISCOSITY EXOFIN 1ML

## (undated) DEVICE — INTENDED FOR TISSUE SEPARATION, AND OTHER PROCEDURES THAT REQUIRE A SHARP SURGICAL BLADE TO PUNCTURE OR CUT.: Brand: BARD-PARKER SAFETY BLADES SIZE 11, STERILE

## (undated) DEVICE — ECHELON FLEX 60 ARTICULATING ENDOSCOPIC LINEAR CUTTER (NO CARTRIDGE): Brand: ECHELON FLEX ENDOPATH

## (undated) DEVICE — TISSUE RETRIEVAL SYSTEM: Brand: INZII RETRIEVAL SYSTEM

## (undated) DEVICE — SUT MONOCRYL 4-0 PS-2 18 IN Y496G

## (undated) DEVICE — TROCAR: Brand: KII FIOS FIRST ENTRY

## (undated) DEVICE — ENSEAL LAPAROSCOPIC TISSUE SEALER G2 CURVED JAW FOR USE WITH G2 GENERATOR 5MM DIAMETER 35CM SHAFT LENGTH: Brand: ENSEAL

## (undated) DEVICE — ENDOPATH ECHELON ENDOSCOPIC LINEAR CUTTER RELOADS, GREEN, 60MM: Brand: ECHELON ENDOPATH

## (undated) DEVICE — SUT SILK 2-0 TIES 144 IN LA55G

## (undated) DEVICE — SUT VICRYL 0 REEL 54 IN J287G

## (undated) DEVICE — TRAY FOLEY 16FR URIMETER SILICONE SURESTEP

## (undated) DEVICE — GLOVE PI ULTRA TOUCH SZ.7.5